# Patient Record
Sex: MALE | Race: WHITE | NOT HISPANIC OR LATINO | Employment: OTHER | URBAN - NONMETROPOLITAN AREA
[De-identification: names, ages, dates, MRNs, and addresses within clinical notes are randomized per-mention and may not be internally consistent; named-entity substitution may affect disease eponyms.]

---

## 2017-03-07 ENCOUNTER — OFFICE VISIT (OUTPATIENT)
Dept: CARDIOLOGY | Facility: CLINIC | Age: 47
End: 2017-03-07

## 2017-03-07 VITALS
HEART RATE: 86 BPM | RESPIRATION RATE: 16 BRPM | OXYGEN SATURATION: 98 % | HEIGHT: 69 IN | SYSTOLIC BLOOD PRESSURE: 102 MMHG | DIASTOLIC BLOOD PRESSURE: 74 MMHG | WEIGHT: 194 LBS | BODY MASS INDEX: 28.73 KG/M2

## 2017-03-07 DIAGNOSIS — Z71.6 TOBACCO ABUSE COUNSELING: ICD-10-CM

## 2017-03-07 DIAGNOSIS — I10 ESSENTIAL HYPERTENSION: ICD-10-CM

## 2017-03-07 DIAGNOSIS — R55 SYNCOPE AND COLLAPSE: ICD-10-CM

## 2017-03-07 DIAGNOSIS — I20.9 ISCHEMIC CHEST PAIN (HCC): Primary | ICD-10-CM

## 2017-03-07 DIAGNOSIS — E78.5 HYPERLIPIDEMIA, UNSPECIFIED HYPERLIPIDEMIA TYPE: ICD-10-CM

## 2017-03-07 PROCEDURE — 99203 OFFICE O/P NEW LOW 30 MIN: CPT | Performed by: INTERNAL MEDICINE

## 2017-03-07 RX ORDER — LANSOPRAZOLE 15 MG/1
15 CAPSULE, DELAYED RELEASE ORAL DAILY
COMMUNITY
Start: 2014-05-21 | End: 2022-09-20

## 2017-03-07 RX ORDER — ATORVASTATIN CALCIUM 40 MG/1
40 TABLET, FILM COATED ORAL NIGHTLY
Status: ON HOLD | COMMUNITY
Start: 2016-08-17 | End: 2022-09-27 | Stop reason: SDUPTHER

## 2017-03-07 RX ORDER — LISINOPRIL 5 MG/1
5 TABLET ORAL EVERY MORNING
COMMUNITY
Start: 2016-02-08 | End: 2022-09-20

## 2017-03-07 RX ORDER — SERTRALINE HYDROCHLORIDE 25 MG/1
50 TABLET, FILM COATED ORAL DAILY
COMMUNITY
Start: 2016-09-23

## 2017-03-07 RX ORDER — ASPIRIN 81 MG/1
81 TABLET ORAL DAILY
COMMUNITY
End: 2022-09-20

## 2017-03-07 NOTE — PROGRESS NOTES
Chief complaint : Chest pain, shortness of breath, syncope    History of Present Illness this is a very pleasant 46-year-old gentleman who presents today for cardiac evaluation.  Patient was found unconscious on the floor at his workplace.  He was taken to the local emergency room where he was evaluated and treated for possible carbon monoxide poisoning.  He continues to complain of chest discomfort and chest pain which is retrosternal in location exacerbated with physical exertion and relieved at rest.  Patient also complains of dyspnea on minimal exertion.  Patient does have very strong family history of heart disease.      His risk factors for coronary artery disease includes:  · Chronic tobacco abuse  · Family history of coronary artery disease  · Hyperlipidemia  · Diabetes mellitus         Review of Systems   Constitution: Negative.   HENT: Negative.    Eyes: Positive for blurred vision.   Cardiovascular: Positive for chest pain and dyspnea on exertion.   Respiratory: Positive for cough, shortness of breath and sputum production.    Skin: Negative.    Musculoskeletal: Positive for joint pain.   Gastrointestinal: Negative.    Genitourinary: Negative.    Neurological: Positive for numbness.   Psychiatric/Behavioral: Negative.        No past medical history on file.    Family History   Problem Relation Age of Onset   • Hypertension Mother    • Osteoporosis Mother    • Stroke Father    • Heart attack Father    • Diabetes Father    • Heart disease Sister        Review of patient's allergies indicates no known allergies.     reports that he has been smoking Cigarettes.  He has a 51.00 pack-year smoking history. He does not have any smokeless tobacco history on file. He reports that he drinks about 0.6 oz of alcohol per week  He reports that he uses illicit drugs, including Marijuana.    Objective     Vital Signs  Heart Rate:  [86] 86  Resp:  [16] 16  BP: (102)/(74) 102/74    Physical Exam   Constitutional: He is  oriented to person, place, and time. He appears well-developed and well-nourished.   HENT:   Head: Normocephalic and atraumatic.   Eyes: Conjunctivae and EOM are normal. Pupils are equal, round, and reactive to light.   Neck: Neck supple. No JVD present. Carotid bruit is not present. No tracheal deviation and no edema present.   Cardiovascular: Normal rate, regular rhythm, S1 normal, S2 normal, normal heart sounds and intact distal pulses.  Exam reveals no gallop, no S3, no S4 and no friction rub.    No murmur heard.  Pulmonary/Chest: Effort normal and breath sounds normal. He has no wheezes. He has no rales. He exhibits no tenderness.   Abdominal: Bowel sounds are normal. He exhibits no abdominal bruit and no pulsatile midline mass. There is no rebound and no guarding.   Musculoskeletal: Normal range of motion. He exhibits no edema.   Neurological: He is alert and oriented to person, place, and time.   Skin: Skin is warm and dry.   Psychiatric: He has a normal mood and affect.       Procedures    Assessment/Plan     Patient Active Problem List   Diagnosis   • Hyperlipidemia   • Essential hypertension   • Ischemic chest pain   • Tobacco abuse counseling     1. Ischemic chest pain  Patient's chest discomfort appears to be more consistent with angina pectoris.  Plan:  · I have counseled patient regarding tobacco smoking cessation.  I have given patient instructions and guidelines on how to quit smoking cigarettes.  We have also discussed regarding nicotine replacement therapy such as nicotine patches and nicotine lozenges and nicotine gums.  Patient appears to be very appreciative and understanding and is considering to quit smoking.  · I have advised patient to have a nuclear perfusion imaging stress test to evaluate for obstructive coronary disease and ischemia  · We will also obtain a 2-D echocardiogram study to evaluate patient's LV systolic and diastolic function.    2. Essential hypertension  Patient's blood  pressure appears to be well-controlled.  Continue with current dose LISINOPRIL.    3. Hyperlipidemia, unspecified hyperlipidemia type  I have advised patient to continue with current dose of atorvastatin.  We'll obtain his lipid panel from his primary care provider's office.  His latest liver function test appears to be within normal limits.    4.  Syncope and collapse  Patient's syncopal episode that was witnessed may have been secondary to carbon monoxide poisoning.  However I recommend that we perform a Holter monitor recording to evaluate for any potential cardiac arrhythmias.    I discussed the patients findings and my recommendations with patient.    Dinesh Martinez MD  03/07/17  4:21 PM    EMR Dragon/Transcription disclaimer:   Much of this encounter note is an electronic transcription/translation of spoken language to printed text. The electronic translation of spoken language may permit erroneous, or at times, nonsensical words or phrases to be inadvertently transcribed; Although I have reviewed the note for such errors, some may still exist.

## 2017-03-27 LAB
BH CV ECHO MEAS - % IVS THICK: 10 %
BH CV ECHO MEAS - % LVPW THICK: 54.9 %
BH CV ECHO MEAS - ACS: 1.8 CM
BH CV ECHO MEAS - AO MAX PG (FULL): 1.6 MMHG
BH CV ECHO MEAS - AO MAX PG: 4.8 MMHG
BH CV ECHO MEAS - AO MEAN PG (FULL): 1 MMHG
BH CV ECHO MEAS - AO MEAN PG: 3 MMHG
BH CV ECHO MEAS - AO ROOT AREA (BSA CORRECTED): 1.4
BH CV ECHO MEAS - AO ROOT AREA: 6.6 CM^2
BH CV ECHO MEAS - AO ROOT DIAM: 2.9 CM
BH CV ECHO MEAS - AO V2 MAX: 110 CM/SEC
BH CV ECHO MEAS - AO V2 MEAN: 80.1 CM/SEC
BH CV ECHO MEAS - AO V2 VTI: 22.2 CM
BH CV ECHO MEAS - AVA(I,A): 3 CM^2
BH CV ECHO MEAS - AVA(I,D): 3 CM^2
BH CV ECHO MEAS - AVA(V,A): 3.1 CM^2
BH CV ECHO MEAS - AVA(V,D): 3.1 CM^2
BH CV ECHO MEAS - BSA(HAYCOCK): 2.1 M^2
BH CV ECHO MEAS - BSA: 2 M^2
BH CV ECHO MEAS - BZI_BMI: 28.6 KILOGRAMS/M^2
BH CV ECHO MEAS - BZI_METRIC_HEIGHT: 175.3 CM
BH CV ECHO MEAS - BZI_METRIC_WEIGHT: 88 KG
BH CV ECHO MEAS - CONTRAST EF 4CH: 54.9 ML/M^2
BH CV ECHO MEAS - EDV(CUBED): 60.2 ML
BH CV ECHO MEAS - EDV(MOD-SP4): 111 ML
BH CV ECHO MEAS - EDV(TEICH): 66.7 ML
BH CV ECHO MEAS - EF(CUBED): 56.9 %
BH CV ECHO MEAS - EF(MOD-SP4): 54.9 %
BH CV ECHO MEAS - EF(TEICH): 49.2 %
BH CV ECHO MEAS - ESV(CUBED): 25.9 ML
BH CV ECHO MEAS - ESV(MOD-SP4): 50.1 ML
BH CV ECHO MEAS - ESV(TEICH): 33.9 ML
BH CV ECHO MEAS - FS: 24.5 %
BH CV ECHO MEAS - IVS/LVPW: 1
BH CV ECHO MEAS - IVSD: 0.77 CM
BH CV ECHO MEAS - IVSS: 0.85 CM
BH CV ECHO MEAS - LA DIMENSION: 3.7 CM
BH CV ECHO MEAS - LA/AO: 1.3
BH CV ECHO MEAS - LV DIASTOLIC VOL/BSA (35-75): 54.4 ML/M^2
BH CV ECHO MEAS - LV MASS(C)D: 85.6 GRAMS
BH CV ECHO MEAS - LV MASS(C)DI: 42 GRAMS/M^2
BH CV ECHO MEAS - LV MASS(C)S: 82.7 GRAMS
BH CV ECHO MEAS - LV MASS(C)SI: 40.6 GRAMS/M^2
BH CV ECHO MEAS - LV MAX PG: 3.3 MMHG
BH CV ECHO MEAS - LV MEAN PG: 2 MMHG
BH CV ECHO MEAS - LV SYSTOLIC VOL/BSA (12-30): 24.6 ML/M^2
BH CV ECHO MEAS - LV V1 MAX: 90.2 CM/SEC
BH CV ECHO MEAS - LV V1 MEAN: 63.7 CM/SEC
BH CV ECHO MEAS - LV V1 VTI: 17.5 CM
BH CV ECHO MEAS - LVIDD: 3.9 CM
BH CV ECHO MEAS - LVIDS: 3 CM
BH CV ECHO MEAS - LVLD AP4: 7.7 CM
BH CV ECHO MEAS - LVLS AP4: 6.6 CM
BH CV ECHO MEAS - LVOT AREA (M): 3.8 CM^2
BH CV ECHO MEAS - LVOT AREA: 3.8 CM^2
BH CV ECHO MEAS - LVOT DIAM: 2.2 CM
BH CV ECHO MEAS - LVPWD: 0.77 CM
BH CV ECHO MEAS - LVPWS: 1.2 CM
BH CV ECHO MEAS - MV A MAX VEL: 71.8 CM/SEC
BH CV ECHO MEAS - MV DEC SLOPE: 436 CM/SEC^2
BH CV ECHO MEAS - MV E MAX VEL: 85.9 CM/SEC
BH CV ECHO MEAS - MV E/A: 1.2
BH CV ECHO MEAS - PA MAX PG: 1.1 MMHG
BH CV ECHO MEAS - PA MEAN PG: 1 MMHG
BH CV ECHO MEAS - PA V2 MAX: 53.3 CM/SEC
BH CV ECHO MEAS - PA V2 MEAN: 38.8 CM/SEC
BH CV ECHO MEAS - PA V2 VTI: 12 CM
BH CV ECHO MEAS - PULM DIAS VEL: 34.4 CM/SEC
BH CV ECHO MEAS - PULM S/D: 1.1
BH CV ECHO MEAS - PULM SYS VEL: 38.3 CM/SEC
BH CV ECHO MEAS - RAP SYSTOLE: 10 MMHG
BH CV ECHO MEAS - RVSP: 28.9 MMHG
BH CV ECHO MEAS - SI(AO): 71.9 ML/M^2
BH CV ECHO MEAS - SI(CUBED): 16.8 ML/M^2
BH CV ECHO MEAS - SI(LVOT): 32.6 ML/M^2
BH CV ECHO MEAS - SI(MOD-SP4): 29.9 ML/M^2
BH CV ECHO MEAS - SI(TEICH): 16.1 ML/M^2
BH CV ECHO MEAS - SV(AO): 146.6 ML
BH CV ECHO MEAS - SV(CUBED): 34.3 ML
BH CV ECHO MEAS - SV(LVOT): 66.5 ML
BH CV ECHO MEAS - SV(MOD-SP4): 60.9 ML
BH CV ECHO MEAS - SV(TEICH): 32.8 ML
BH CV ECHO MEAS - TR MAX VEL: 217 CM/SEC

## 2017-04-18 ENCOUNTER — TRANSCRIBE ORDERS (OUTPATIENT)
Dept: GENERAL RADIOLOGY | Facility: CLINIC | Age: 47
End: 2017-04-18

## 2017-04-18 DIAGNOSIS — R31.0 GROSS HEMATURIA: Primary | ICD-10-CM

## 2017-04-25 ENCOUNTER — OFFICE VISIT (OUTPATIENT)
Dept: CARDIOLOGY | Facility: CLINIC | Age: 47
End: 2017-04-25

## 2017-04-25 VITALS
SYSTOLIC BLOOD PRESSURE: 110 MMHG | HEIGHT: 69 IN | BODY MASS INDEX: 27.68 KG/M2 | WEIGHT: 186.9 LBS | HEART RATE: 84 BPM | OXYGEN SATURATION: 98 % | DIASTOLIC BLOOD PRESSURE: 72 MMHG

## 2017-04-25 DIAGNOSIS — I10 ESSENTIAL HYPERTENSION: ICD-10-CM

## 2017-04-25 DIAGNOSIS — R94.39 ABNORMAL NUCLEAR STRESS TEST: Primary | ICD-10-CM

## 2017-04-25 DIAGNOSIS — R55 SYNCOPE AND COLLAPSE: ICD-10-CM

## 2017-04-25 DIAGNOSIS — E78.5 HYPERLIPIDEMIA, UNSPECIFIED HYPERLIPIDEMIA TYPE: Primary | ICD-10-CM

## 2017-04-25 PROCEDURE — 99214 OFFICE O/P EST MOD 30 MIN: CPT | Performed by: INTERNAL MEDICINE

## 2017-04-25 RX ORDER — INSULIN GLARGINE 100 [IU]/ML
INJECTION, SOLUTION SUBCUTANEOUS
Refills: 0 | COMMUNITY
Start: 2017-03-27 | End: 2017-06-15 | Stop reason: ALTCHOICE

## 2017-04-25 RX ORDER — SODIUM CHLORIDE 9 MG/ML
75 INJECTION, SOLUTION INTRAVENOUS CONTINUOUS
Status: CANCELLED | OUTPATIENT
Start: 2017-05-17

## 2017-04-25 RX ORDER — PROMETHAZINE HYDROCHLORIDE 25 MG/ML
12.5 INJECTION, SOLUTION INTRAMUSCULAR; INTRAVENOUS EVERY 4 HOURS PRN
Status: CANCELLED | OUTPATIENT
Start: 2017-05-17

## 2017-04-25 NOTE — PROGRESS NOTES
Chief complaint : Abnormal stress test    History of Present Illness this is a very pleasant 56-year-old gentleman who comes today to discuss findings of his studies including nuclear perfusion imaging stress test Holter monitor and 2-D echocardiogram.  He has not had anymore syncopal symptoms..  He continues to have chest discomfort.  He continues to have dyspnea on exertion.         Review of Systems   Cardiovascular: Positive for chest pain and dyspnea on exertion.       No past medical history on file.    Family History   Problem Relation Age of Onset   • Hypertension Mother    • Osteoporosis Mother    • Stroke Father    • Heart attack Father    • Diabetes Father    • Heart disease Sister        Review of patient's allergies indicates no known allergies.     reports that he has been smoking Cigarettes.  He has a 51.00 pack-year smoking history. He does not have any smokeless tobacco history on file. He reports that he drinks about 0.6 oz of alcohol per week  He reports that he uses illicit drugs, including Marijuana.    Objective     Vital Signs  Heart Rate:  [84] 84  BP: (110)/(72) 110/72    Physical Exam   Constitutional: He is oriented to person, place, and time. He appears well-developed and well-nourished.   HENT:   Head: Normocephalic and atraumatic.   Eyes: Conjunctivae and EOM are normal. Pupils are equal, round, and reactive to light.   Neck: Neck supple. No JVD present. Carotid bruit is not present. No tracheal deviation and no edema present.   Cardiovascular: Normal rate, regular rhythm, S1 normal, S2 normal, normal heart sounds and intact distal pulses.  Exam reveals no gallop, no S3, no S4 and no friction rub.    No murmur heard.  Pulmonary/Chest: Effort normal and breath sounds normal. He has no wheezes. He has no rales. He exhibits no tenderness.   Abdominal: Bowel sounds are normal. He exhibits no abdominal bruit and no pulsatile midline mass. There is no rebound and no guarding.    Musculoskeletal: Normal range of motion. He exhibits no edema.   Neurological: He is alert and oriented to person, place, and time.   Skin: Skin is warm and dry.   Psychiatric: He has a normal mood and affect.       Procedures    Assessment/Plan     Patient Active Problem List   Diagnosis   • Hyperlipidemia   • Essential hypertension   • Ischemic chest pain   • Tobacco abuse counseling     1. Hyperlipidemia, unspecified hyperlipidemia type  Patient currently is tolerating Lipitor very well.  He will need a fasting lipid panel and liver function test.  We'll continue with risk factor modification and guideline direct medical therapy.    2. Essential hypertension  His well controlled we'll continue with current medication.    3. Syncope and collapse  Patient has not had anymore syncopal episodes.    · Patient's nuclear perfusion imaging stress test was abnormal with evidence of ischemia of moderate size moderate severity in the right coronary artery distribution  · Patient's Holter monitor was essentially unremarkable with no evidence of malignant arrhythmias.  · 2-D echocardiogram revealed normal LV systolic function  Plan:  · I have discussed his results with him in detail.  I have recommended diagnostic coronary angiogram and possible PCI as indicated.  · I have discussed the indication alternatives and all potential complications of diagnostic coronary angiogram with the patient and he was given me permission to schedule him for the procedure.  All his questions were answered to his satisfaction.  · I have also showed him and educational  video of cardiac catheterization and possible stent placement.    I discussed the patients findings and my recommendations with patient.    Dinesh Martinez MD  04/25/17  6:22 PM    EMR Dragon/Transcription disclaimer:   Much of this encounter note is an electronic transcription/translation of spoken language to printed text. The electronic translation of spoken language may permit  erroneous, or at times, nonsensical words or phrases to be inadvertently transcribed; Although I have reviewed the note for such errors, some may still exist.

## 2017-05-17 ENCOUNTER — HOSPITAL ENCOUNTER (OUTPATIENT)
Facility: HOSPITAL | Age: 47
Setting detail: HOSPITAL OUTPATIENT SURGERY
Discharge: HOME | End: 2017-05-17
Attending: INTERNAL MEDICINE | Admitting: INTERNAL MEDICINE

## 2017-05-17 VITALS
HEIGHT: 69 IN | BODY MASS INDEX: 26.64 KG/M2 | RESPIRATION RATE: 18 BRPM | OXYGEN SATURATION: 99 % | SYSTOLIC BLOOD PRESSURE: 115 MMHG | WEIGHT: 179.9 LBS | DIASTOLIC BLOOD PRESSURE: 69 MMHG | TEMPERATURE: 97.9 F | HEART RATE: 59 BPM

## 2017-05-17 DIAGNOSIS — R94.39 ABNORMAL NUCLEAR STRESS TEST: ICD-10-CM

## 2017-05-17 LAB
ALBUMIN SERPL-MCNC: 4 G/DL (ref 3.4–4.8)
ALBUMIN/GLOB SERPL: 1.4 G/DL (ref 1.1–1.8)
ALP SERPL-CCNC: 99 U/L (ref 38–126)
ALT SERPL W P-5'-P-CCNC: 31 U/L (ref 21–72)
ANION GAP SERPL CALCULATED.3IONS-SCNC: 9 MMOL/L (ref 5–15)
ARTICHOKE IGE QN: 77 MG/DL (ref 1–129)
AST SERPL-CCNC: 24 U/L (ref 17–59)
BILIRUB SERPL-MCNC: 0.4 MG/DL (ref 0.2–1.3)
BUN BLD-MCNC: 15 MG/DL (ref 7–21)
BUN/CREAT SERPL: 19.7 (ref 7–25)
CALCIUM SPEC-SCNC: 9.2 MG/DL (ref 8.4–10.2)
CHLORIDE SERPL-SCNC: 101 MMOL/L (ref 95–110)
CHOLEST SERPL-MCNC: 133 MG/DL (ref 0–199)
CO2 SERPL-SCNC: 27 MMOL/L (ref 22–31)
CREAT BLD-MCNC: 0.76 MG/DL (ref 0.7–1.3)
DEPRECATED RDW RBC AUTO: 37.6 FL (ref 35.1–43.9)
ERYTHROCYTE [DISTWIDTH] IN BLOOD BY AUTOMATED COUNT: 12.5 % (ref 11.5–14.5)
GFR SERPL CREATININE-BSD FRML MDRD: 109 ML/MIN/1.73 (ref 60–147)
GLOBULIN UR ELPH-MCNC: 2.8 GM/DL (ref 2.3–3.5)
GLUCOSE BLD-MCNC: 194 MG/DL (ref 60–100)
HCT VFR BLD AUTO: 39.9 % (ref 39–49)
HDLC SERPL-MCNC: 38 MG/DL (ref 60–200)
HGB BLD-MCNC: 14.2 G/DL (ref 13.7–17.3)
INR PPP: 0.99 (ref 0.8–1.2)
LDLC/HDLC SERPL: 2.09 {RATIO} (ref 0–3.55)
MCH RBC QN AUTO: 29.6 PG (ref 26.5–34)
MCHC RBC AUTO-ENTMCNC: 35.6 G/DL (ref 31.5–36.3)
MCV RBC AUTO: 83.3 FL (ref 80–98)
PLATELET # BLD AUTO: 282 10*3/MM3 (ref 150–450)
PMV BLD AUTO: 10.1 FL (ref 8–12)
POTASSIUM BLD-SCNC: 4.3 MMOL/L (ref 3.5–5.1)
PROT SERPL-MCNC: 6.8 G/DL (ref 6.3–8.6)
PROTHROMBIN TIME: 13 SECONDS (ref 11.1–15.3)
RBC # BLD AUTO: 4.79 10*6/MM3 (ref 4.37–5.74)
SODIUM BLD-SCNC: 137 MMOL/L (ref 137–145)
TRIGL SERPL-MCNC: 77 MG/DL (ref 20–199)
WBC NRBC COR # BLD: 6.29 10*3/MM3 (ref 3.2–9.8)

## 2017-05-17 PROCEDURE — C1760 CLOSURE DEV, VASC: HCPCS | Performed by: INTERNAL MEDICINE

## 2017-05-17 PROCEDURE — 80061 LIPID PANEL: CPT | Performed by: INTERNAL MEDICINE

## 2017-05-17 PROCEDURE — 25010000002 FENTANYL CITRATE (PF) 100 MCG/2ML SOLUTION: Performed by: INTERNAL MEDICINE

## 2017-05-17 PROCEDURE — 80053 COMPREHEN METABOLIC PANEL: CPT | Performed by: INTERNAL MEDICINE

## 2017-05-17 PROCEDURE — 93458 L HRT ARTERY/VENTRICLE ANGIO: CPT | Performed by: INTERNAL MEDICINE

## 2017-05-17 PROCEDURE — C1894 INTRO/SHEATH, NON-LASER: HCPCS | Performed by: INTERNAL MEDICINE

## 2017-05-17 PROCEDURE — C1769 GUIDE WIRE: HCPCS | Performed by: INTERNAL MEDICINE

## 2017-05-17 PROCEDURE — 85610 PROTHROMBIN TIME: CPT | Performed by: INTERNAL MEDICINE

## 2017-05-17 PROCEDURE — 85027 COMPLETE CBC AUTOMATED: CPT | Performed by: INTERNAL MEDICINE

## 2017-05-17 PROCEDURE — 25010000002 MIDAZOLAM PER 1 MG: Performed by: INTERNAL MEDICINE

## 2017-05-17 PROCEDURE — 0 IOPAMIDOL PER 1 ML: Performed by: INTERNAL MEDICINE

## 2017-05-17 RX ORDER — LIDOCAINE HYDROCHLORIDE 20 MG/ML
INJECTION, SOLUTION INFILTRATION; PERINEURAL AS NEEDED
Status: DISCONTINUED | OUTPATIENT
Start: 2017-05-17 | End: 2017-05-17 | Stop reason: HOSPADM

## 2017-05-17 RX ORDER — FENTANYL CITRATE 50 UG/ML
INJECTION, SOLUTION INTRAMUSCULAR; INTRAVENOUS AS NEEDED
Status: DISCONTINUED | OUTPATIENT
Start: 2017-05-17 | End: 2017-05-17 | Stop reason: HOSPADM

## 2017-05-17 RX ORDER — PROMETHAZINE HYDROCHLORIDE 25 MG/ML
12.5 INJECTION, SOLUTION INTRAMUSCULAR; INTRAVENOUS EVERY 4 HOURS PRN
Status: DISCONTINUED | OUTPATIENT
Start: 2017-05-17 | End: 2017-05-17 | Stop reason: HOSPADM

## 2017-05-17 RX ORDER — SODIUM CHLORIDE 9 MG/ML
75 INJECTION, SOLUTION INTRAVENOUS CONTINUOUS
Status: DISCONTINUED | OUTPATIENT
Start: 2017-05-17 | End: 2017-05-17 | Stop reason: HOSPADM

## 2017-05-17 RX ORDER — MIDAZOLAM HYDROCHLORIDE 1 MG/ML
INJECTION INTRAMUSCULAR; INTRAVENOUS AS NEEDED
Status: DISCONTINUED | OUTPATIENT
Start: 2017-05-17 | End: 2017-05-17 | Stop reason: HOSPADM

## 2017-05-17 RX ORDER — SODIUM CHLORIDE 9 MG/ML
125 INJECTION, SOLUTION INTRAVENOUS CONTINUOUS
Status: DISCONTINUED | OUTPATIENT
Start: 2017-05-17 | End: 2017-05-17 | Stop reason: HOSPADM

## 2017-05-17 RX ADMIN — SODIUM CHLORIDE 75 ML/HR: 9 INJECTION, SOLUTION INTRAVENOUS at 08:10

## 2017-06-15 ENCOUNTER — OFFICE VISIT (OUTPATIENT)
Dept: CARDIOLOGY | Facility: CLINIC | Age: 47
End: 2017-06-15

## 2017-06-15 VITALS
DIASTOLIC BLOOD PRESSURE: 80 MMHG | BODY MASS INDEX: 26.22 KG/M2 | OXYGEN SATURATION: 98 % | HEIGHT: 69 IN | HEART RATE: 87 BPM | SYSTOLIC BLOOD PRESSURE: 136 MMHG | WEIGHT: 177 LBS | RESPIRATION RATE: 16 BRPM

## 2017-06-15 DIAGNOSIS — E78.5 HYPERLIPIDEMIA, UNSPECIFIED HYPERLIPIDEMIA TYPE: ICD-10-CM

## 2017-06-15 DIAGNOSIS — I10 ESSENTIAL HYPERTENSION: Primary | ICD-10-CM

## 2017-06-15 PROCEDURE — 99212 OFFICE O/P EST SF 10 MIN: CPT | Performed by: INTERNAL MEDICINE

## 2017-06-15 RX ORDER — INSULIN DEGLUDEC INJECTION 100 U/ML
12 INJECTION, SOLUTION SUBCUTANEOUS NIGHTLY
Refills: 0 | COMMUNITY
Start: 2017-06-05 | End: 2022-09-20

## 2017-06-15 NOTE — PROGRESS NOTES
Chief complaint : Coronary artery disease    History of Present Illness very pleasant 47-year-old gentleman who comes today for a follow-up visit.  He underwent diagnostic coronary angiogram which revealed nonobstructive coronary artery disease in the right coronary artery and the diagonal branch of the LAD.  Patient has no chest pain or chest discomfort.  He denies shortness of breath orthopnea or PND.  Patient had no complications from the diagnostic coronary angiogram.    Subjective      Review of Systems   Cardiovascular: Negative for chest pain and dyspnea on exertion.       History reviewed. No pertinent past medical history.    Family History   Problem Relation Age of Onset   • Hypertension Mother    • Osteoporosis Mother    • Stroke Father    • Heart attack Father    • Diabetes Father    • Heart disease Sister        Review of patient's allergies indicates no known allergies.     reports that he has been smoking Cigarettes.  He has a 51.00 pack-year smoking history. He does not have any smokeless tobacco history on file. He reports that he drinks about 0.6 oz of alcohol per week  He reports that he uses illicit drugs, including Marijuana.    Objective     Vital Signs  Heart Rate:  [87] 87  Resp:  [16] 16  BP: (136)/(80) 136/80    Physical Exam   Constitutional: He is oriented to person, place, and time. He appears well-developed and well-nourished.   HENT:   Head: Normocephalic and atraumatic.   Eyes: Conjunctivae and EOM are normal. Pupils are equal, round, and reactive to light.   Neck: Neck supple. No JVD present. Carotid bruit is not present. No tracheal deviation and no edema present.   Cardiovascular: Normal rate, regular rhythm, S1 normal, S2 normal, normal heart sounds and intact distal pulses.  Exam reveals no gallop, no S3, no S4 and no friction rub.    No murmur heard.  Pulmonary/Chest: Effort normal and breath sounds normal. He has no wheezes. He has no rales. He exhibits no tenderness.    Abdominal: Bowel sounds are normal. He exhibits no abdominal bruit and no pulsatile midline mass. There is no rebound and no guarding.   Musculoskeletal: Normal range of motion. He exhibits no edema.   Neurological: He is alert and oriented to person, place, and time.   Skin: Skin is warm and dry.   Psychiatric: He has a normal mood and affect.       Procedures    Assessment/Plan     Patient Active Problem List   Diagnosis   • Hyperlipidemia   • Essential hypertension   • Ischemic chest pain   • Tobacco abuse counseling     1. Essential hypertension  Blood pressure is well-controlled at this time.  Continue with current medications    2. Hyperlipidemia, unspecified hyperlipidemia type  Patient is tolerating Lipitor very well.  Continue with current medication    I discussed the patients findings and my recommendations with patient.    Dinesh Martinez MD  06/15/17  3:59 PM

## 2019-10-29 ENCOUNTER — OFFICE VISIT (OUTPATIENT)
Dept: ENDOCRINOLOGY | Facility: CLINIC | Age: 49
End: 2019-10-29

## 2019-10-29 VITALS
HEIGHT: 69 IN | BODY MASS INDEX: 28.17 KG/M2 | OXYGEN SATURATION: 100 % | HEART RATE: 80 BPM | SYSTOLIC BLOOD PRESSURE: 140 MMHG | WEIGHT: 190.2 LBS | DIASTOLIC BLOOD PRESSURE: 78 MMHG

## 2019-10-29 DIAGNOSIS — Z79.4 TYPE 2 DIABETES MELLITUS WITH HYPERGLYCEMIA, WITH LONG-TERM CURRENT USE OF INSULIN (HCC): Primary | ICD-10-CM

## 2019-10-29 DIAGNOSIS — E11.65 TYPE 2 DIABETES MELLITUS WITH HYPERGLYCEMIA, WITH LONG-TERM CURRENT USE OF INSULIN (HCC): Primary | ICD-10-CM

## 2019-10-29 DIAGNOSIS — F17.200 SMOKING: ICD-10-CM

## 2019-10-29 DIAGNOSIS — E78.5 HYPERLIPIDEMIA, UNSPECIFIED HYPERLIPIDEMIA TYPE: ICD-10-CM

## 2019-10-29 DIAGNOSIS — I10 ESSENTIAL HYPERTENSION: ICD-10-CM

## 2019-10-29 PROCEDURE — 99204 OFFICE O/P NEW MOD 45 MIN: CPT | Performed by: NURSE PRACTITIONER

## 2019-10-29 PROCEDURE — 95250 CONT GLUC MNTR PHYS/QHP EQP: CPT | Performed by: NURSE PRACTITIONER

## 2019-10-29 RX ORDER — TIZANIDINE 4 MG/1
4 TABLET ORAL NIGHTLY PRN
COMMUNITY
End: 2022-09-20

## 2019-10-29 RX ORDER — TAMSULOSIN HYDROCHLORIDE 0.4 MG/1
1 CAPSULE ORAL DAILY
COMMUNITY

## 2019-10-29 RX ORDER — AMITRIPTYLINE HYDROCHLORIDE 50 MG/1
50 TABLET, FILM COATED ORAL NIGHTLY
COMMUNITY

## 2019-10-29 RX ORDER — DICLOFENAC SODIUM AND MISOPROSTOL 75; 200 MG/1; UG/1
1 TABLET, DELAYED RELEASE ORAL 2 TIMES DAILY
COMMUNITY
End: 2022-09-20

## 2019-10-29 NOTE — PATIENT INSTRUCTIONS
Keep metformin     Decrease Tresiba to 30 units once daily       Start Trulicity 0.75 mg once weekly     Side effects include nausea     If abdominal pain or vomiting stop medication and call me     Megan RAMOS       Eat a healthy meal and check 2 hours after meal --- if the sugar 180 or less then you don't need insulin for that meal    If 2 hours after meals the sugar is higher than 180 for that meal you will need 4 to 6 units

## 2019-10-29 NOTE — PROGRESS NOTES
Subjective    HPI       Referring provider Leon Salas     49 year old male presents for consultation     REASON - diabetes mellitus       Duration/Timing -diagnosed in 2016 / constant       Quality -not controlled       Severity -   Moderate due to complications       Severity (Complication/Hospitalizations)          Secondary Macrovascular -- no CAD, no PAD, no CVA         Secondary Microvascular -- neuropathy in hands, no diabetic retinopathy, no renal disease       Context -- presented for lead poisoning and blood sugar was 900             Diabetes Regimen -- oral medications, insulin       Complications -- hyperglycemia and hypoglycemia            Lab Results   Component Value Date    HGBA1C 10.6 (H) 09/07/2019           Blood Glucose Readings      Checks 3 to 4 times week           Last night was 48 and this morning was 58       higest 270 up to 340       Diet-       Regular diet     Does not drink regular coke or sweet tea        Associated Signs/Symptoms         Hyperglycemic Symptoms: polydipsia       Hypoglycemic Episodes: nocturnal --- sweating       Review of Systems  Review of Systems   Constitutional: Negative for activity change, appetite change, chills, fatigue, unexpected weight gain and unexpected weight loss.   HENT: Negative for congestion, dental problem, ear discharge, ear pain, swollen glands, tinnitus, trouble swallowing and voice change.    Eyes: Negative for blurred vision, photophobia, pain, discharge, redness, itching and visual disturbance.   Respiratory: Negative for apnea, cough, choking, chest tightness, shortness of breath and wheezing.    Cardiovascular: Negative for chest pain, palpitations and leg swelling.   Gastrointestinal: Negative for abdominal distention, abdominal pain, constipation, diarrhea, nausea and vomiting.   Endocrine: Negative for cold intolerance, heat intolerance, polydipsia and polyphagia.   Genitourinary: Negative for breast discharge, decreased libido,  decreased urine volume and difficulty urinating.   Musculoskeletal: Negative for arthralgias, back pain and neck pain.   Skin: Negative for color change, dry skin and skin lesions.   Allergic/Immunologic: Negative for environmental allergies, food allergies and immunocompromised state.   Neurological: Negative for dizziness, tremors, weakness, numbness, headache, memory problem and confusion.   Hematological: Negative for adenopathy.   Psychiatric/Behavioral: Negative for agitation, behavioral problems, decreased concentration and depressed mood. The patient is not nervous/anxious.      Wt Readings from Last 3 Encounters:   10/29/19 86.3 kg (190 lb 3.2 oz)   06/15/17 80.3 kg (177 lb)   05/17/17 81.6 kg (179 lb 14.3 oz)     Temp Readings from Last 3 Encounters:   05/17/17 97.9 °F (36.6 °C)   04/30/17 98.5 °F (36.9 °C) (Oral)     BP Readings from Last 3 Encounters:   10/29/19 140/78   06/15/17 136/80   05/17/17 115/69     Pulse Readings from Last 3 Encounters:   10/29/19 80   06/15/17 87   05/17/17 59       Physical Exam  Physical Exam   Constitutional: He is oriented to person, place, and time. He appears well-developed and well-nourished. No distress.   HENT:   Head: Normocephalic and atraumatic.   Right Ear: External ear normal.   Left Ear: External ear normal.   Nose: Nose normal.   Eyes: Conjunctivae and EOM are normal. Pupils are equal, round, and reactive to light.   Neck: Normal range of motion. Neck supple. No tracheal deviation present. No thyromegaly present.   Cardiovascular: Normal rate, regular rhythm and normal heart sounds.   No murmur heard.  Pulmonary/Chest: Effort normal and breath sounds normal. No respiratory distress. He has no wheezes.   Abdominal: Soft. Bowel sounds are normal. There is no tenderness. There is no rebound and no guarding.   Musculoskeletal: Normal range of motion. He exhibits no edema, tenderness or deformity.      During the foot exam he had a monofilament test performed.     Neurological Sensory Findings -  Left ankle/foot altered proprioception. No right ankle/foot altered proprioception  Vascular Status -  His right foot exhibits no edema. His left foot exhibits no edema.  Skin Integrity  -  He has callous right foot and right heel is dry and cracked.He has callous left foot and left heel dry and cracked..  Neurological: He is alert and oriented to person, place, and time. No cranial nerve deficit.   Skin: Skin is warm and dry. No rash noted.   Psychiatric: He has a normal mood and affect. His behavior is normal. Judgment and thought content normal.         Assessment/Plan       Diagnosis Plan   1. Type 2 diabetes mellitus with hyperglycemia, with long-term current use of insulin (CMS/[facility])     2. Hyperlipidemia, unspecified hyperlipidemia type     3. Essential hypertension     4. Smoking           Glycemic Management    Diabetes Mellitus       Lab Results   Component Value Date    HGBA1C 10.6 (H) 09/07/2019           Metformin 1000 po BID --keep       Tresiba taking 22 units BID --- decrease to 30 units once daily       Humulin R 100 --- 10 units TID with each meal --- decrease to 4 units up to 6 units with each meal      However I want you to eat a healthy meal and give no insulin -- check bg 2 hours later if sugar is less than 180 then for that meal you don't need to give insulin for that meal    Eat an unhealthy meal and then check 2 hours later --- if BG above 180 for that meal you will give 4 to 6 units         Start Trulicity 0.75 mg once weekly     Side effects include nausea     If nauseated eat less     If abdominal pain or vomiting stop medication and call me         Uncontrolled diabetes  Hyperglycemia    Insertion of continuous glucose monitor to define patter    The continuous glucose monitor that was inserted is a ana luisa glucose monitor    Return in 2 weeks           Lipid Management      Lipitor 40 mg one at night       Lab Results   Component Value Date    CHOL 172  09/07/2019     Lab Results   Component Value Date    TRIG 53 09/07/2019     Lab Results   Component Value Date    HDL 49 09/07/2019     Lab Results   Component Value Date     (H) 09/07/2019     No results found for: VLDL  Lab Results   Component Value Date    LDLHDL 2.09 05/17/2017       Blood Pressure Management        Lisinopril 5 mg daily           Microvascular Complication Monitoring        Last eye exam --  Overdue --states will make an appt      Foot exam done today       No results found for: CRP      Bone Health      Lab Results   Component Value Date    CALCIUM 9.5 07/10/2018             Other Diabetes Related Aspects      No results found for: [id number]      Thyroid Health      No results found for: TSH       Preventive Care:    Jasmine Hernández is a current cigarettes user.  He currently smokes 12 pack of cigarettes and cigarettes per day for a duration of 30 years. I have educated him on the risk of diseases from using tobacco products such as cancer, COPD and heart diease.     I advised him to quit and he is not willing to quit.    I spent 3  minutes counseling the patient.        Return in about 2 weeks (around 11/12/2019) for Recheck.        Records from  received and reviewed from 2019  Thank you for this consultation

## 2019-10-30 ENCOUNTER — TELEPHONE (OUTPATIENT)
Dept: FAMILY MEDICINE CLINIC | Facility: CLINIC | Age: 49
End: 2019-10-30

## 2019-10-30 NOTE — TELEPHONE ENCOUNTER
Wife called and left  and we called back . She said he took his 1 X a week med couldn't remember if in was trulicity or tresiba and reacted badly . Sugar dropped to 20's and he got it up to 80's went to work today and came home having chest pains, sweating and short of breathe. They are at the [facility] now .Wife gave the number [phone]    Thank You

## 2019-10-30 NOTE — TELEPHONE ENCOUNTER
Just wanted you forward this to you. I tried calling and no answer.....    (Wife called and left VM and we called back . She said he took his 1 X a week med couldn't remember if in was trulicity or tresiba and reacted badly . Sugar dropped to 20's and he got it up to 80's went to work today and came home having chest pains, sweating and short of breathe. They are at the [facility] ER now .Wife gave the number [phone])

## 2019-10-31 NOTE — TELEPHONE ENCOUNTER
Spoke with wife- she said he had severe back pain, chest pain, headaches and cramps. They went to the ER and they did test and ruled out stroke or heart attack. They thought it might be the trulicity but they said the ER said it wasn't. Any changes or need him to come in?

## 2019-11-18 ENCOUNTER — OFFICE VISIT (OUTPATIENT)
Dept: ENDOCRINOLOGY | Facility: CLINIC | Age: 49
End: 2019-11-18

## 2019-11-18 ENCOUNTER — TELEPHONE (OUTPATIENT)
Dept: ENDOCRINOLOGY | Facility: CLINIC | Age: 49
End: 2019-11-18

## 2019-11-18 VITALS
DIASTOLIC BLOOD PRESSURE: 74 MMHG | SYSTOLIC BLOOD PRESSURE: 130 MMHG | OXYGEN SATURATION: 99 % | HEART RATE: 80 BPM | BODY MASS INDEX: 28.07 KG/M2 | WEIGHT: 189.5 LBS | HEIGHT: 69 IN

## 2019-11-18 DIAGNOSIS — Z79.4 TYPE 2 DIABETES MELLITUS WITH HYPOGLYCEMIA WITHOUT COMA, WITH LONG-TERM CURRENT USE OF INSULIN (HCC): Primary | ICD-10-CM

## 2019-11-18 DIAGNOSIS — E11.649 TYPE 2 DIABETES MELLITUS WITH HYPOGLYCEMIA WITHOUT COMA, WITH LONG-TERM CURRENT USE OF INSULIN (HCC): ICD-10-CM

## 2019-11-18 DIAGNOSIS — Z79.4 TYPE 2 DIABETES MELLITUS WITH HYPOGLYCEMIA WITHOUT COMA, WITH LONG-TERM CURRENT USE OF INSULIN (HCC): ICD-10-CM

## 2019-11-18 DIAGNOSIS — Z71.6 TOBACCO ABUSE COUNSELING: Primary | ICD-10-CM

## 2019-11-18 DIAGNOSIS — E11.649 TYPE 2 DIABETES MELLITUS WITH HYPOGLYCEMIA WITHOUT COMA, WITH LONG-TERM CURRENT USE OF INSULIN (HCC): Primary | ICD-10-CM

## 2019-11-18 PROCEDURE — 95251 CONT GLUC MNTR ANALYSIS I&R: CPT | Performed by: NURSE PRACTITIONER

## 2019-11-18 PROCEDURE — 99214 OFFICE O/P EST MOD 30 MIN: CPT | Performed by: NURSE PRACTITIONER

## 2019-11-18 RX ORDER — FLASH GLUCOSE SENSOR
KIT MISCELLANEOUS
Qty: 3 EACH | Refills: 11 | Status: SHIPPED | OUTPATIENT
Start: 2019-11-18

## 2019-11-18 RX ORDER — FLASH GLUCOSE SENSOR
1 KIT MISCELLANEOUS AS NEEDED
Qty: 1 DEVICE | Refills: 1 | Status: SHIPPED | OUTPATIENT
Start: 2019-11-18

## 2019-11-18 RX ORDER — INSULIN LISPRO 100 [IU]/ML
INJECTION, SOLUTION INTRAVENOUS; SUBCUTANEOUS
Qty: 2 PEN | Refills: 11 | Status: SHIPPED | OUTPATIENT
Start: 2019-11-18 | End: 2022-09-20 | Stop reason: ALTCHOICE

## 2019-11-18 NOTE — PROGRESS NOTES
Subjective    Dionisio Hernández is a 49 y.o. male. he is here today for follow-up.    History of Present Illness         Referring provider Leon Salas      49 year old male presents for follow up      REASON - diabetes mellitus         Duration/Timing -diagnosed in 2016 / constant         Quality -not controlled         Severity -   Moderate due to complications         Severity (Complication/Hospitalizations)           Secondary Macrovascular -- no CAD, no PAD, no CVA         Secondary Microvascular -- neuropathy in hands, no diabetic retinopathy, no renal disease         Context -- presented for lead poisoning and blood sugar was 900                Diabetes Regimen -- oral medications, insulin         Complications -- hyperglycemia and hypoglycemia                   Lab Results   Component Value Date     HGBA1C 10.6 (H) 09/07/2019               Blood Glucose Readings        Checks 3 to 4 times week        Trish reviewed and downloaded     October 29 - Nov 3, 2019       Average bg 139     46% in target     32% above target     22% low sugar         Had severe low presented to ER --- low of 22               Diet-        Regular diet      Does not drink regular coke or sweet tea           Associated Signs/Symptoms          Hyperglycemic Symptoms: polydipsia       Hypoglycemic Episodes: nocturnal --- sweating    The following portions of the patient's history were reviewed and updated as appropriate:   Past Medical History:   Diagnosis Date   • Cancer (CMS/HCC)    • High blood pressure    • High cholesterol    • Kidney stones    • Osteoporosis    • Stroke (cerebrum) (CMS/HCC)    • Type 2 diabetes mellitus (CMS/HCC)      Past Surgical History:   Procedure Laterality Date   • CARDIAC CATHETERIZATION N/A 5/17/2017    Procedure: Left Heart Cath;  Surgeon: Dinesh Martinez MD;  Location: [facility] CATH INVASIVE LOCATION;  Service:      Family History   Problem Relation Age of Onset   • Hypertension Mother    • Osteoporosis  Mother    • Stroke Father    • Heart attack Father    • Diabetes Father    • Heart disease Sister        Current Outpatient Medications   Medication Sig Dispense Refill   • amitriptyline (ELAVIL) 50 MG tablet Take 50 mg by mouth Every Night.     • aspirin (ASPIRIN ADULT LOW DOSE) 81 MG EC tablet Take 81 mg by mouth Daily.     • atorvastatin (LIPITOR) 40 MG tablet Take 40 mg by mouth Every Night.     • diclofenac-miSOPROStol (ARTHROTEC 75) 75-0.2 MG EC tablet Take 1 tablet by mouth 2 (Two) Times a Day.     • lansoprazole (PREVACID) 15 MG capsule Take 15 mg by mouth Daily.     • lisinopril (PRINIVIL,ZESTRIL) 5 MG tablet Take 5 mg by mouth Every Morning.     • metFORMIN (GLUCOPHAGE) 1000 MG tablet Take 1,000 mg by mouth 2 (Two) Times a Day.     • sertraline (ZOLOFT) 25 MG tablet Take 50 mg by mouth Daily.     • tamsulosin (FLOMAX) 0.4 MG capsule 24 hr capsule Take 1 capsule by mouth Daily.     • tiZANidine (ZANAFLEX) 4 MG tablet Take 4 mg by mouth At Night As Needed for Muscle Spasms.     • TRESIBA FLEXTOUCH 100 UNIT/ML solution pen-injector injection Inject 12 Units under the skin Every Night.  0   • Continuous Blood Gluc  (FREESTYLE LYNDA READER) device 1 each As Needed (glucose monitoring). E10.65 1 Device 1   • Continuous Blood Gluc Sensor (FREESTYLE LYNDA SENSOR SYSTEM) Change every 14 days, E10.65 3 each 11   • Dulaglutide 0.75 MG/0.5ML solution pen-injector Inject 0.75 mg under the skin into the appropriate area as directed 1 (One) Time Per Week. 4 pen 11   • Insulin Lispro, 1 Unit Dial, (HUMALOG KWIKPEN) 100 UNIT/ML solution pen-injector Take 2 units up to 8 units based on carb count TID before meals 2 pen 11     No current facility-administered medications for this visit.      No Known Allergies  Social History     Socioeconomic History   • Marital status: Single     Spouse name: Not on file   • Number of children: Not on file   • Years of education: Not on file   • Highest education level: Not on file  "  Occupational History   • Occupation:    Tobacco Use   • Smoking status: Current Every Day Smoker     Packs/day: 1.50     Years: 34.00     Pack years: 51.00     Types: Cigarettes   • Tobacco comment: Cut down to 3 cigarettes daily   Substance and Sexual Activity   • Alcohol use: Yes     Alcohol/week: 0.6 oz     Types: 1 Cans of beer per week     Comment: fifth of whiskey one time of month   • Drug use: Yes     Types: Marijuana     Comment: daily   • Sexual activity: Defer       Review of Systems  Review of Systems   Constitutional: Negative for activity change, appetite change, diaphoresis and fatigue.   HENT: Negative for facial swelling, sneezing, sore throat, tinnitus, trouble swallowing and voice change.    Eyes: Negative for photophobia, pain, discharge, redness, itching and visual disturbance.   Respiratory: Negative for apnea, cough, choking, chest tightness and shortness of breath.    Cardiovascular: Negative for chest pain, palpitations and leg swelling.   Gastrointestinal: Negative for abdominal distention, abdominal pain, constipation, diarrhea, nausea and vomiting.   Endocrine: Negative for cold intolerance, heat intolerance, polydipsia, polyphagia and polyuria.   Genitourinary: Negative for difficulty urinating, dysuria, frequency, hematuria and urgency.   Musculoskeletal: Negative for arthralgias, back pain, gait problem, joint swelling, myalgias, neck pain and neck stiffness.   Skin: Negative for color change, pallor, rash and wound.   Neurological: Negative for dizziness, tremors, weakness, light-headedness, numbness and headaches.   Hematological: Negative for adenopathy. Does not bruise/bleed easily.   Psychiatric/Behavioral: Negative for behavioral problems, confusion and sleep disturbance.        Objective    /74   Pulse 80   Ht 175.3 cm (69\")   Wt 86 kg (189 lb 8 oz)   SpO2 99%   BMI 27.98 kg/m²   Physical Exam   Constitutional: He is oriented to person, place, and time. He " appears well-developed and well-nourished. No distress.   HENT:   Head: Normocephalic and atraumatic.   Right Ear: External ear normal.   Left Ear: External ear normal.   Nose: Nose normal.   Eyes: Conjunctivae and EOM are normal. Pupils are equal, round, and reactive to light.   Neck: Normal range of motion. Neck supple. No tracheal deviation present. No thyromegaly present.   Cardiovascular: Normal rate, regular rhythm and normal heart sounds.   No murmur heard.  Pulmonary/Chest: Effort normal and breath sounds normal. No respiratory distress. He has no wheezes.   Abdominal: Soft. Bowel sounds are normal. There is no tenderness. There is no rebound and no guarding.   Musculoskeletal: Normal range of motion. He exhibits no edema, tenderness or deformity.   Neurological: He is alert and oriented to person, place, and time. No cranial nerve deficit.   Skin: Skin is warm and dry. No rash noted.   Psychiatric: He has a normal mood and affect. His behavior is normal. Judgment and thought content normal.       Lab Review  Glucose (mg/dL)   Date Value   05/17/2017 194 (H)     Sodium (mmol/L)   Date Value   07/10/2018 137   04/09/2018 137   01/09/2018 135     Potassium (mmol/L)   Date Value   07/10/2018 4.3   04/09/2018 4.3   01/09/2018 4.4     Chloride (mmol/L)   Date Value   07/10/2018 102   04/09/2018 100   01/09/2018 98     CO2 (mmol/L)   Date Value   05/17/2017 27.0     BUN (mg/dL)   Date Value   07/10/2018 19 (H)   04/09/2018 17   01/09/2018 17     Creatinine (mg/dL)   Date Value   07/10/2018 1   04/09/2018 1.1   01/09/2018 1     Hemoglobin A1C (%)   Date Value   09/07/2019 10.6 (H)   05/23/2019 10.7 (H)   03/02/2019 9.7 (H)     Triglycerides (mg/dL)   Date Value   09/07/2019 53   05/23/2019 69   03/02/2019 30     LDL Cholesterol  (mg/dL)   Date Value   09/07/2019 110 (H)   05/23/2019 64   03/02/2019 73       Assessment/Plan      1. Tobacco abuse counseling    2. Type 2 diabetes mellitus with hypoglycemia without  coma, with long-term current use of insulin (CMS/[facility])    .    Medications prescribed:  Outpatient Encounter Medications as of 11/18/2019   Medication Sig Dispense Refill   • amitriptyline (ELAVIL) 50 MG tablet Take 50 mg by mouth Every Night.     • aspirin (ASPIRIN ADULT LOW DOSE) 81 MG EC tablet Take 81 mg by mouth Daily.     • atorvastatin (LIPITOR) 40 MG tablet Take 40 mg by mouth Every Night.     • diclofenac-miSOPROStol (ARTHROTEC 75) 75-0.2 MG EC tablet Take 1 tablet by mouth 2 (Two) Times a Day.     • lansoprazole (PREVACID) 15 MG capsule Take 15 mg by mouth Daily.     • lisinopril (PRINIVIL,ZESTRIL) 5 MG tablet Take 5 mg by mouth Every Morning.     • metFORMIN (GLUCOPHAGE) 1000 MG tablet Take 1,000 mg by mouth 2 (Two) Times a Day.     • sertraline (ZOLOFT) 25 MG tablet Take 50 mg by mouth Daily.     • tamsulosin (FLOMAX) 0.4 MG capsule 24 hr capsule Take 1 capsule by mouth Daily.     • tiZANidine (ZANAFLEX) 4 MG tablet Take 4 mg by mouth At Night As Needed for Muscle Spasms.     • TRESIBA FLEXTOUCH 100 UNIT/ML solution pen-injector injection Inject 12 Units under the skin Every Night.  0   • [DISCONTINUED] insulin regular (HUMULIN R) 500 UNIT/ML CONCENTRATED injection Inject 10 Units under the skin into the appropriate area as directed 3 (Three) Times a Day Before Meals.     • Continuous Blood Gluc  (FREESTYLE LYNDA READER) device 1 each As Needed (glucose monitoring). E10.65 1 Device 1   • Continuous Blood Gluc Sensor (FREESTYLE LYNDA SENSOR SYSTEM) Change every 14 days, E10.65 3 each 11   • Dulaglutide 0.75 MG/0.5ML solution pen-injector Inject 0.75 mg under the skin into the appropriate area as directed 1 (One) Time Per Week. 4 pen 11   • Insulin Lispro, 1 Unit Dial, (HUMALOG KWIKPEN) 100 UNIT/ML solution pen-injector Take 2 units up to 8 units based on carb count TID before meals 2 pen 11     No facility-administered encounter medications on file as of 11/18/2019.        Orders placed during this  encounter include:  No orders of the defined types were placed in this encounter.    Glycemic Management     Diabetes Mellitus               Lab Results   Component Value Date     HGBA1C 10.6 (H) 09/07/2019               Metformin 1000 po BID --keep         Tresiba taking 30 units once daily- -- decrease to 25 units         Humulin R 100 --- 10 units TID with each meal --- decrease to 4 units up to 6 units with each meal---change to carb counting         Change to Humalog     1 unit per 15 grams of CHO          Trulicity 0.75 mg once weekly ----stopped due to low sugar and made sick                 Trish reviewed and downloaded     Average bg 139     46% in target     32% above target     22% low sugar     Decrease Tresiba to 25 units     Mealtime do 1 unit per 15 gm of CHO    Stop Trulicity     Meet with magdiel for diabetes education               Lipid Management        Lipitor 40 mg one at night               Lab Results   Component Value Date     CHOL 172 09/07/2019            Lab Results   Component Value Date     TRIG 53 09/07/2019            Lab Results   Component Value Date     HDL 49 09/07/2019            Lab Results   Component Value Date      (H) 09/07/2019      No results found for: VLDL        Lab Results   Component Value Date     LDLHDL 2.09 05/17/2017         Blood Pressure Management           Lisinopril 5 mg daily               Microvascular Complication Monitoring           Last eye exam --  Overdue --states will make an appt        Foot exam done today         No results found for: CRP        Bone Health        Lab Results   Component Value Date     CALCIUM 9.5 07/10/2018                  Other Diabetes Related Aspects        No results found for: [id number]        Thyroid Health        No results found for: TSH         Preventive Care:     Smoking    Dionisio Hernández is a current cigarettes user.  He currently smokes 10 pack of cigarettes and cigarettes per day for a duration of 20  years. I have educated him on the risk of diseases from using tobacco products such as cancer, COPD and heart diease.     I advised him to quit and he is not willing to quit.    I spent 3  minutes counseling the patient.                    4. Follow-up: Return in about 8 weeks (around 1/13/2020) for Recheck.

## 2019-11-18 NOTE — PROGRESS NOTES
Dionisio Hernández is a 49 y.o.. male seen by diabetes educator on 11/18/2019 for review of medications and carbohydrate counting education.     1. Carbohydrate Counting/ Exchange Choices and Healthy Foods   I. Reviewed carbohydrate-containing foods, standard serving sizes, how to measure foods, and reading nutrition labels.   II. Provided patient with carbohydrate counting booklet (Carb counting and meal planning book).   III. Provided patient with list of non-starchy vegetables and foods that are low in carbohydrate for snacks and to incorporate with meals.     IV. Instructed patient to eat 60-75 grams of carbohydrate with each meal (4-5 exchange choices) and 15-30 grams of carbohydrate for snacks (1-2 exchange choices).    V. Reviewed the difference between simple and complex carbohydrate. Encouraged patient to choose complex carbohydrates more often.     VI. Choose fruits, vegetables, whole grains, legumes, low-fat milk, fiber-rich foods, minimal saturated fats, and watch cholesterol and sodium intake.    VII. Patient demonstrated understanding by correctly identifying and calculating carbohydrate amounts for various meals.    2. Humalog Dosing   I. 1 units of Humalog for every 15 grams of carbohydrate eaten plus 1 units per 50 mg/dl above 150 sliding scale   II. Patient demonstrated understanding by calculating correct dosage for example meals and blood sugars    3. Tresiba Dosing   I. Decrease dose to 25 units. Same time every day. Patient stated he normally gives between 5-7pm.     II. Reviewed titration: if fasting blood glucose is above 150, increase dose by 2 units. Review dose and change if necessary twice a week. If waking sugar is below 100, decrease dose by 2 units.     Please call office if hypoglycemia continues. Office number provided.     Valerie Bernard, YULISSAN, RN  Diabetes Educator

## 2020-01-13 ENCOUNTER — OFFICE VISIT (OUTPATIENT)
Dept: ENDOCRINOLOGY | Facility: CLINIC | Age: 50
End: 2020-01-13

## 2020-01-13 VITALS
SYSTOLIC BLOOD PRESSURE: 132 MMHG | HEART RATE: 132 BPM | WEIGHT: 184 LBS | BODY MASS INDEX: 27.25 KG/M2 | OXYGEN SATURATION: 94 % | HEIGHT: 69 IN | DIASTOLIC BLOOD PRESSURE: 74 MMHG

## 2020-01-13 DIAGNOSIS — F17.200 SMOKING: ICD-10-CM

## 2020-01-13 DIAGNOSIS — E78.5 HYPERLIPIDEMIA, UNSPECIFIED HYPERLIPIDEMIA TYPE: ICD-10-CM

## 2020-01-13 DIAGNOSIS — E11.649 TYPE 2 DIABETES MELLITUS WITH HYPOGLYCEMIA WITHOUT COMA, WITH LONG-TERM CURRENT USE OF INSULIN (HCC): Primary | ICD-10-CM

## 2020-01-13 DIAGNOSIS — I10 ESSENTIAL HYPERTENSION: ICD-10-CM

## 2020-01-13 DIAGNOSIS — Z79.4 TYPE 2 DIABETES MELLITUS WITH HYPOGLYCEMIA WITHOUT COMA, WITH LONG-TERM CURRENT USE OF INSULIN (HCC): Primary | ICD-10-CM

## 2020-01-13 PROCEDURE — 99214 OFFICE O/P EST MOD 30 MIN: CPT | Performed by: NURSE PRACTITIONER

## 2020-01-13 NOTE — PROGRESS NOTES
Subjective    Dionisio Hernándze is a 49 y.o. male. he is here today for follow-up.    History of Present Illness         Referring provider Leon Salas      49 year old male presents for follow up      REASON - diabetes mellitus         Duration/Timing -diagnosed in 2016 / constant         Quality -not controlled         Severity -   Moderate due to complications     Complications -- hypoglycemia and hyperglycemia         Severity (Complication/Hospitalizations)           Secondary Macrovascular -- no CAD, no PAD, no CVA         Secondary Microvascular -- neuropathy in hands, no diabetic retinopathy, no renal disease         Context -- presented for lead poisoning and blood sugar was 900                Diabetes Regimen -- oral medications, insulin         Complications -- hyperglycemia and hypoglycemia      Lab Results   Component Value Date    HGBA1C 12.7 (H) 01/10/2020                   Blood Glucose Readings        Checks 3 to 4 times week              No more low sugars     Lowest was 90 since last visit       Has been on steroids and had an injection of steroids bg was up to 500                         Diet-        Regular diet      Does not drink regular coke or sweet tea           Associated Signs/Symptoms          Hyperglycemic Symptoms: polydipsia       Hypoglycemic Episodes: nocturnal --- sweating       The following portions of the patient's history were reviewed and updated as appropriate:   Past Medical History:   Diagnosis Date   • Cancer (CMS/HCC)    • High blood pressure    • High cholesterol    • Kidney stones    • Osteoporosis    • Stroke (cerebrum) (CMS/HCC)    • Type 2 diabetes mellitus (CMS/HCC)      Past Surgical History:   Procedure Laterality Date   • CARDIAC CATHETERIZATION N/A 5/17/2017    Procedure: Left Heart Cath;  Surgeon: Dinesh Martinez MD;  Location: [facility] CATH INVASIVE LOCATION;  Service:      Family History   Problem Relation Age of Onset   • Hypertension Mother    •  Osteoporosis Mother    • Stroke Father    • Heart attack Father    • Diabetes Father    • Heart disease Sister        Current Outpatient Medications   Medication Sig Dispense Refill   • amitriptyline (ELAVIL) 50 MG tablet Take 50 mg by mouth Every Night.     • aspirin (ASPIRIN ADULT LOW DOSE) 81 MG EC tablet Take 81 mg by mouth Daily.     • atorvastatin (LIPITOR) 40 MG tablet Take 40 mg by mouth Every Night.     • Continuous Blood Gluc  (FREESTYLE LYNDA READER) device 1 each As Needed (glucose monitoring). E10.65 1 Device 1   • Continuous Blood Gluc Sensor (FREESTYLE LYNDA SENSOR SYSTEM) Change every 14 days, E10.65 3 each 11   • diclofenac-miSOPROStol (ARTHROTEC 75) 75-0.2 MG EC tablet Take 1 tablet by mouth 2 (Two) Times a Day.     • Dulaglutide 0.75 MG/0.5ML solution pen-injector Inject 0.75 mg under the skin into the appropriate area as directed 1 (One) Time Per Week. 4 pen 11   • Insulin Lispro, 1 Unit Dial, (HUMALOG KWIKPEN) 100 UNIT/ML solution pen-injector Take 2 units up to 8 units based on carb count TID before meals 2 pen 11   • lansoprazole (PREVACID) 15 MG capsule Take 15 mg by mouth Daily.     • lisinopril (PRINIVIL,ZESTRIL) 5 MG tablet Take 5 mg by mouth Every Morning.     • metFORMIN (GLUCOPHAGE) 1000 MG tablet Take 1,000 mg by mouth 2 (Two) Times a Day.     • sertraline (ZOLOFT) 25 MG tablet Take 50 mg by mouth Daily.     • tamsulosin (FLOMAX) 0.4 MG capsule 24 hr capsule Take 1 capsule by mouth Daily.     • tiZANidine (ZANAFLEX) 4 MG tablet Take 4 mg by mouth At Night As Needed for Muscle Spasms.     • TRESIBA FLEXTOUCH 100 UNIT/ML solution pen-injector injection Inject 12 Units under the skin Every Night.  0     No current facility-administered medications for this visit.      No Known Allergies  Social History     Socioeconomic History   • Marital status: Single     Spouse name: Not on file   • Number of children: Not on file   • Years of education: Not on file   • Highest education  "level: Not on file   Occupational History   • Occupation:    Tobacco Use   • Smoking status: Current Every Day Smoker     Packs/day: 1.50     Years: 34.00     Pack years: 51.00     Types: Cigarettes   • Tobacco comment: Cut down to 3 cigarettes daily   Substance and Sexual Activity   • Alcohol use: Yes     Alcohol/week: 1.0 standard drinks     Types: 1 Cans of beer per week     Comment: fifth of whiskey one time of month   • Drug use: Yes     Types: Marijuana     Comment: daily   • Sexual activity: Defer       Review of Systems  Review of Systems   Constitutional: Negative for activity change, appetite change, diaphoresis and fatigue.   HENT: Negative for facial swelling, sneezing, sore throat, tinnitus, trouble swallowing and voice change.    Eyes: Negative for photophobia, pain, discharge, redness, itching and visual disturbance.   Respiratory: Negative for apnea, cough, choking, chest tightness and shortness of breath.    Cardiovascular: Negative for chest pain, palpitations and leg swelling.   Gastrointestinal: Negative for abdominal distention, abdominal pain, constipation, diarrhea, nausea and vomiting.   Endocrine: Negative for cold intolerance, heat intolerance, polydipsia, polyphagia and polyuria.   Genitourinary: Negative for difficulty urinating, dysuria, frequency, hematuria and urgency.   Musculoskeletal: Negative for arthralgias, back pain, gait problem, joint swelling, myalgias, neck pain and neck stiffness.   Skin: Negative for color change, pallor, rash and wound.   Neurological: Negative for dizziness, tremors, weakness, light-headedness, numbness and headaches.   Hematological: Negative for adenopathy. Does not bruise/bleed easily.   Psychiatric/Behavioral: Negative for behavioral problems, confusion and sleep disturbance.        Objective    /74   Pulse (!) 132   Ht 175.3 cm (69\")   Wt 83.5 kg (184 lb)   SpO2 94%   BMI 27.17 kg/m²   Physical Exam   Constitutional: He is oriented " to person, place, and time. He appears well-developed and well-nourished. No distress.   HENT:   Head: Normocephalic and atraumatic.   Right Ear: External ear normal.   Left Ear: External ear normal.   Nose: Nose normal.   Eyes: Pupils are equal, round, and reactive to light. Conjunctivae and EOM are normal.   Neck: Normal range of motion. Neck supple. No tracheal deviation present. No thyromegaly present.   Cardiovascular: Normal rate, regular rhythm and normal heart sounds.   No murmur heard.  Pulmonary/Chest: Effort normal and breath sounds normal. No respiratory distress. He has no wheezes.   Abdominal: Soft. Bowel sounds are normal. There is no tenderness. There is no rebound and no guarding.   Musculoskeletal: Normal range of motion. He exhibits no edema, tenderness or deformity.   Neurological: He is alert and oriented to person, place, and time. No cranial nerve deficit.   Skin: Skin is warm and dry. No rash noted.   Psychiatric: He has a normal mood and affect. His behavior is normal. Judgment and thought content normal.       Lab Review  Glucose (mg/dL)   Date Value   05/17/2017 194 (H)     Sodium (mmol/L)   Date Value   07/10/2018 137   04/09/2018 137   01/09/2018 135     Potassium (mmol/L)   Date Value   07/10/2018 4.3   04/09/2018 4.3   01/09/2018 4.4     Chloride (mmol/L)   Date Value   07/10/2018 102   04/09/2018 100   01/09/2018 98     CO2 (mmol/L)   Date Value   05/17/2017 27.0     BUN (mg/dL)   Date Value   07/10/2018 19 (H)   04/09/2018 17   01/09/2018 17     Creatinine (mg/dL)   Date Value   07/10/2018 1   04/09/2018 1.1   01/09/2018 1     Hemoglobin A1C (%)   Date Value   01/10/2020 12.7 (H)   09/07/2019 10.6 (H)   05/23/2019 10.7 (H)     Triglycerides (mg/dL)   Date Value   09/07/2019 53   05/23/2019 69   03/02/2019 30     LDL Cholesterol  (mg/dL)   Date Value   09/07/2019 110 (H)   05/23/2019 64   03/02/2019 73       Assessment/Plan      1. Type 2 diabetes mellitus with hypoglycemia without  coma, with long-term current use of insulin (CMS/[facility])    2. Hyperlipidemia, unspecified hyperlipidemia type    3. Essential hypertension    4. Smoking    .    Medications prescribed:  Outpatient Encounter Medications as of 1/13/2020   Medication Sig Dispense Refill   • amitriptyline (ELAVIL) 50 MG tablet Take 50 mg by mouth Every Night.     • aspirin (ASPIRIN ADULT LOW DOSE) 81 MG EC tablet Take 81 mg by mouth Daily.     • atorvastatin (LIPITOR) 40 MG tablet Take 40 mg by mouth Every Night.     • Continuous Blood Gluc  (FREESTYLE LYNDA READER) device 1 each As Needed (glucose monitoring). E10.65 1 Device 1   • Continuous Blood Gluc Sensor (FREESTYLE LYNDA SENSOR SYSTEM) Change every 14 days, E10.65 3 each 11   • diclofenac-miSOPROStol (ARTHROTEC 75) 75-0.2 MG EC tablet Take 1 tablet by mouth 2 (Two) Times a Day.     • Dulaglutide 0.75 MG/0.5ML solution pen-injector Inject 0.75 mg under the skin into the appropriate area as directed 1 (One) Time Per Week. 4 pen 11   • Insulin Lispro, 1 Unit Dial, (HUMALOG KWIKPEN) 100 UNIT/ML solution pen-injector Take 2 units up to 8 units based on carb count TID before meals 2 pen 11   • lansoprazole (PREVACID) 15 MG capsule Take 15 mg by mouth Daily.     • lisinopril (PRINIVIL,ZESTRIL) 5 MG tablet Take 5 mg by mouth Every Morning.     • metFORMIN (GLUCOPHAGE) 1000 MG tablet Take 1,000 mg by mouth 2 (Two) Times a Day.     • sertraline (ZOLOFT) 25 MG tablet Take 50 mg by mouth Daily.     • tamsulosin (FLOMAX) 0.4 MG capsule 24 hr capsule Take 1 capsule by mouth Daily.     • tiZANidine (ZANAFLEX) 4 MG tablet Take 4 mg by mouth At Night As Needed for Muscle Spasms.     • TRESIBA FLEXTOUCH 100 UNIT/ML solution pen-injector injection Inject 12 Units under the skin Every Night.  0     No facility-administered encounter medications on file as of 1/13/2020.        Orders placed during this encounter include:  Orders Placed This Encounter   Procedures   • C-Peptide   • Comprehensive  Metabolic Panel   • Glutamic Acid Decarboxylase   • Comprehensive Metabolic Panel     Standing Status:   Future     Standing Expiration Date:   1/13/2021   • C-Peptide     Standing Status:   Future     Standing Expiration Date:   1/13/2021   • Glutamic Acid Decarboxylase     Standing Status:   Future     Standing Expiration Date:   1/13/2021     Glycemic Management     Diabetes Mellitus      Lab Results   Component Value Date    HGBA1C 12.7 (H) 01/10/2020                   Metformin 1000 po BID --keep         Tresiba taking  25 units                 Humalog      1 unit per 15 grams of CHO ---increase to 2 unit per 15 grams of CHO          Trulicity 0.75 mg once weekly ----stopped due to low sugar and made sick                 Stop Trulicity                    Lipid Management        Lipitor 40 mg one at night      Jan. 2020    LDL - 109         Blood Pressure Management           Lisinopril 5 mg daily               Microvascular Complication Monitoring           Last eye exam --  Overdue --states will make an appt        Foot exam done today         No results found for: CRP        Bone Health              Lab Results   Component Value Date     CALCIUM 9.5 07/10/2018                  Other Diabetes Related Aspects        No results found for: [id number]        Thyroid Health        No results found for: TSH         Preventive Care:     Jasmine     Dionisio Hernández  reports that he has been smoking cigarettes. He has a 51.00 pack-year smoking history. He does not have any smokeless tobacco history on file.. I have educated him on the risk of diseases from using tobacco products such as cancer, COPD and heart diease.     I advised him to quit and he is not willing to quit.    I spent 3  minutes counseling the patient.                      4. Follow-up: Return in about 3 months (around 4/13/2020) for Recheck.

## 2022-03-10 ENCOUNTER — TRANSCRIBE ORDERS (OUTPATIENT)
Dept: LAB | Facility: OTHER | Age: 52
End: 2022-03-10

## 2022-03-10 DIAGNOSIS — E11.649 TYPE 2 DIABETES MELLITUS WITH HYPOGLYCEMIA WITHOUT COMA, WITH LONG-TERM CURRENT USE OF INSULIN: Primary | ICD-10-CM

## 2022-03-10 DIAGNOSIS — Z79.4 TYPE 2 DIABETES MELLITUS WITH HYPOGLYCEMIA WITHOUT COMA, WITH LONG-TERM CURRENT USE OF INSULIN: Primary | ICD-10-CM

## 2022-04-07 ENCOUNTER — TRANSCRIBE ORDERS (OUTPATIENT)
Dept: LAB | Facility: OTHER | Age: 52
End: 2022-04-07

## 2022-04-07 DIAGNOSIS — E11.65 TYPE II DIABETES MELLITUS WITH HYPEROSMOLARITY, UNCONTROLLED: Primary | ICD-10-CM

## 2022-04-07 DIAGNOSIS — E11.00 TYPE II DIABETES MELLITUS WITH HYPEROSMOLARITY, UNCONTROLLED: Primary | ICD-10-CM

## 2022-09-20 ENCOUNTER — OFFICE VISIT (OUTPATIENT)
Dept: CARDIOLOGY | Facility: CLINIC | Age: 52
End: 2022-09-20

## 2022-09-20 VITALS
HEIGHT: 69 IN | BODY MASS INDEX: 27.86 KG/M2 | SYSTOLIC BLOOD PRESSURE: 146 MMHG | OXYGEN SATURATION: 99 % | WEIGHT: 188.1 LBS | HEART RATE: 64 BPM | DIASTOLIC BLOOD PRESSURE: 90 MMHG

## 2022-09-20 DIAGNOSIS — R07.9 CHEST PAIN, UNSPECIFIED TYPE: Primary | ICD-10-CM

## 2022-09-20 DIAGNOSIS — I20.8 EXERTIONAL ANGINA: ICD-10-CM

## 2022-09-20 DIAGNOSIS — Z79.4 TYPE 2 DIABETES MELLITUS WITH HYPOGLYCEMIA WITHOUT COMA, WITH LONG-TERM CURRENT USE OF INSULIN: ICD-10-CM

## 2022-09-20 DIAGNOSIS — Z71.6 TOBACCO ABUSE COUNSELING: ICD-10-CM

## 2022-09-20 DIAGNOSIS — R42 DIZZINESS: ICD-10-CM

## 2022-09-20 DIAGNOSIS — E78.2 MIXED HYPERLIPIDEMIA: ICD-10-CM

## 2022-09-20 DIAGNOSIS — E11.649 TYPE 2 DIABETES MELLITUS WITH HYPOGLYCEMIA WITHOUT COMA, WITH LONG-TERM CURRENT USE OF INSULIN: ICD-10-CM

## 2022-09-20 DIAGNOSIS — I10 ESSENTIAL HYPERTENSION: ICD-10-CM

## 2022-09-20 PROBLEM — I20.89 EXERTIONAL ANGINA: Status: ACTIVE | Noted: 2022-09-20

## 2022-09-20 LAB
QT INTERVAL: 416 MS
QTC INTERVAL: 429 MS

## 2022-09-20 PROCEDURE — 99243 OFF/OP CNSLTJ NEW/EST LOW 30: CPT | Performed by: INTERNAL MEDICINE

## 2022-09-20 PROCEDURE — 93000 ELECTROCARDIOGRAM COMPLETE: CPT | Performed by: INTERNAL MEDICINE

## 2022-09-20 RX ORDER — ASPIRIN 81 MG/1
81 TABLET ORAL DAILY
Qty: 90 TABLET | Refills: 3
Start: 2022-09-20

## 2022-09-20 RX ORDER — LOSARTAN POTASSIUM 50 MG/1
50 TABLET ORAL DAILY
Qty: 90 TABLET | Refills: 3 | Status: SHIPPED | OUTPATIENT
Start: 2022-09-20

## 2022-09-20 NOTE — PROGRESS NOTES
Dionisio Hernández  52 y.o. male    09/20/2022  1. Chest pain, unspecified type    2. Exertional angina (HCC)    3. Essential hypertension    4. Type 2 diabetes mellitus with hypoglycemia without coma, with long-term current use of insulin (HCC)    5. Tobacco abuse counseling    6. Mixed hyperlipidemia    7. Dizziness        History of Present Illness  Dionisio Hernández is a 52-year-old male with multiple medical issues which include difficult control diabetes mellitus, hypertension, hyperlipidemia, tobacco abuse and known previous documented coronary artery disease who is being seen by me for the first time.  He presents for evaluation of intermittent episodes of chest pain with been going on for about 3 to 4 months.  He has also had pain in the back of the neck sometimes radiating down his left arm not always related to chest pain.  He has had episodes of dizziness and syncope which goes back several years and he has had work-up for this in the past.  CT scan of the brain in August 2022 did not reveal any acute abnormalities.  The patient has had episodes of diabetic ketoacidosis in the past for which he has been admitted to [facility].    Back in March 2017 he was evaluated for chest pain and had an abnormal stress test that led to a cardiac catheterization by Dr. Martinez in May 2017 which showed the following findings:     CORONARY ANGIOGRAPHY: Right dominant coronary artery system.  Left main: The left main coronary artery originates from the left coronary sinus.  It divides into LAD and left circumflex.  The left main is free of obstructive disease.  LAD: The left anterior descending coronary artery is a decent caliber vessel.  It courses along the anterior interventricular groove.  It extends although down to the apex.  The LAD gives rise to 3 diagonal branches that originate relatively high in the LAD.  What appears to be a third diagonal branch is relatively long but small in caliber.  It gives a second  subbranch.  The mid segment of the diagonal branch has approximately 80% stenosis which is approximately 20 mm in length. The LAD is free of obstructive disease.  Left circumflex: The left circumflex is a medium caliber vessel.  It courses along the anterior AV groove.  It gives rise to a marginal branch proximally.  The left circumflex is free of obstructive stenosis.   RCA: The right coronary artery originates from the right coronary sinus itself medium caliber vessel.  It courses along the posterior AV groove.  It gives rise to PDA and 2 posterolateral branches.  Mid to proximal segment has approximately 50-70% stenosis.    PDA:The PDA appears to be free of obstructive disease.     Patient was treated medically at that time.    EKG today showed sinus rhythm with heart rate of 64 bpm.  No ST-T wave changes diagnostic of ischemia.  Leftward axis.    SUBJECTIVE    Allergies   Allergen Reactions   • Dulaglutide Angioedema and Swelling         Past Medical History:   Diagnosis Date   • Cancer (HCC)    • High blood pressure    • High cholesterol    • Kidney stones    • Osteoporosis    • Stroke (cerebrum) ([facility])    • Type 2 diabetes mellitus ([facility])          Past Surgical History:   Procedure Laterality Date   • CARDIAC CATHETERIZATION N/A 05/17/2017    Procedure: Left Heart Cath;  Surgeon: Dinesh Martinez MD;  Location: [facility] CATH INVASIVE LOCATION;  Service:    • FINGER SURGERY     • LEG SURGERY           Family History   Problem Relation Age of Onset   • Hypertension Mother    • Osteoporosis Mother    • Stroke Father    • Heart attack Father    • Diabetes Father    • Heart disease Sister          Social History     Socioeconomic History   • Marital status:    Tobacco Use   • Smoking status: Current Every Day Smoker     Packs/day: 1.00     Years: 34.00     Pack years: 34.00     Types: Cigarettes   • Smokeless tobacco: Former User     Types: Snuff     Quit date: 2015   Vaping Use   • Vaping Use: Former   •  "Substances: Nicotine   • Devices: Disposable   Substance and Sexual Activity   • Alcohol use: Yes     Alcohol/week: 1.0 standard drink     Types: 1 Cans of beer per week     Comment: fifth of whiskey one time of month   • Drug use: Not Currently     Types: Marijuana     Comment: daily   • Sexual activity: Defer         Current Outpatient Medications   Medication Sig Dispense Refill   • amitriptyline (ELAVIL) 50 MG tablet Take 50 mg by mouth Every Night.     • atorvastatin (LIPITOR) 40 MG tablet Take 40 mg by mouth Every Night.     • Continuous Blood Gluc  (FREESTYLE LYNDA READER) device 1 each As Needed (glucose monitoring). E10.65 1 Device 1   • Continuous Blood Gluc Sensor (FREESTYLE LYNDA SENSOR SYSTEM) Change every 14 days, E10.65 3 each 11   • Insulin Glargine (LANTUS SOLOSTAR) 100 UNIT/ML injection pen 50 units sub q q am, 40 qhs     • sertraline (ZOLOFT) 25 MG tablet Take 50 mg by mouth Daily.     • tamsulosin (FLOMAX) 0.4 MG capsule 24 hr capsule Take 1 capsule by mouth Daily.     • aspirin (aspirin) 81 MG EC tablet Take 1 tablet by mouth Daily. 90 tablet 3   • Insulin Lispro-aabc (LYUMJEV KWIKPEN SC) Inject  under the skin into the appropriate area as directed. Sliding scale     • losartan (Cozaar) 50 MG tablet Take 1 tablet by mouth Daily. 90 tablet 3   • METHOCARBAMOL PO Take 500 mg by mouth 3 (Three) Times a Day.       No current facility-administered medications for this visit.         OBJECTIVE    /90 (BP Location: Left arm, Patient Position: Sitting, Cuff Size: Adult)   Pulse 64   Ht 175.3 cm (69\")   Wt 85.3 kg (188 lb 1.6 oz)   SpO2 99%   BMI 27.78 kg/m²         Review of Systems     Constitutional:  Denies recent weight loss, weight gain, fever or chills     HENT:  Denies any hearing loss, epistaxis, hoarseness, or difficulty speaking.     Eyes: Wears eyeglasses or contact lenses     Respiratory:  Denies dyspnea with exertion, no cough, wheezing, or hemoptysis.     Cardiovascular: " See HPI    Gastrointestinal:  Denies change in bowel habits, dyspepsia, ulcer disease, hematochezia, or melena.     Endocrine: Negative for cold intolerance, heat intolerance, polydipsia, polyphagia and polyuria.  Diabetes not under control, has hyperlipidemia    Genitourinary: Negative.  History of renal stones in the past      Musculoskeletal: Denies any history of arthritic symptoms or back problems     Skin:  Denies any change in hair or nails, rashes, or skin lesions.     Allergic/Immunologic: Negative.  Negative for environmental allergies, food allergies and/or immunocompromised state.     Neurological:  Denies any history of recurrent headaches, strokes, TIA, or seizure disorder.  History of dizziness/syncope    Hematological: Denies any food allergies, seasonal allergies, bleeding disorders, or lymphadenopathy.     Psychiatric/Behavioral: Denies any history of depression, substance abuse, or change in cognitive function.     Physical Exam     Constitutional: Cooperative, alert and oriented, in no acute distress.     HENT:   Head: Normocephalic, normal hair patterns, no masses or tenderness.  Ears, Nose, and Throat: No gross abnormalities. No pallor or cyanosis. Dentition good.   Eyes: EOMS intact, PERRL, conjunctivae and lids unremarkable. Fundoscopic exam and visual fields not performed.   Neck: No palpable masses or adenopathy, no thyromegaly, no JVD, carotid pulses are full and equal bilaterally and without bruit.     Cardiovascular: Regular rhythm, S1 and S2 normal, no S3 or S4.  No murmurs, gallops, or rubs detected.     Pulmonary/Chest: Chest: normal symmetry, normal respiratory excursion, no intercostal retraction, no use of accessory muscles.     Pulmonary: Normal breath sounds. No rales or rhonchi.    Abdominal: Abdomen soft, bowel sounds normoactive, no masses, no hepatosplenomegaly, nontender, no bruit.     Musculoskeletal: No deformities, clubbing, cyanosis, erythema, or edema observed.      Neurological: No gross motor or sensory deficits noted, affect appropriate, oriented to time, person, place.     Skin: Warm and dry to the touch, no apparent skin lesion or mass noted.     Psychiatric: He has a normal mood and affect. His behavior is normal. Judgment and thought content normal.         Procedures      Lab Results   Component Value Date    WBC 15.4 (H) 10/30/2019    HGB 13.1 (L) 10/30/2019    HCT 38.5 (L) 10/30/2019    MCV 90 10/30/2019     10/30/2019     Lab Results   Component Value Date    GLUCOSE 194 (H) 05/17/2017    BUN 14 07/26/2022    CREATININE 1.0 07/26/2022    EGFRIFNONA 109 05/17/2017    EGFRIFAFRI 107 08/03/2021    BCR 19.7 05/17/2017    CO2 29 07/26/2022    CALCIUM 8.6 07/26/2022    ALBUMIN 4.0 07/26/2022    AST 13 07/26/2022    ALT 12 07/26/2022     Lab Results   Component Value Date    CHOL 162 04/30/2021    CHOL 155 01/21/2021    CHOL 159 08/25/2020     Lab Results   Component Value Date    TRIG 57 11/12/2021    TRIG 51 08/03/2021    TRIG 57 04/30/2021     Lab Results   Component Value Date    HDL 41 11/12/2021    HDL 45 08/03/2021    HDL 42 04/30/2021     No components found for: LDLCALC  Lab Results   Component Value Date     11/12/2021    LDL 81 08/03/2021    LDL 96 04/30/2021     No results found for: HDLLDLRATIO  No components found for: CHOLHDL  Lab Results   Component Value Date    HGBA1C 9.1 (H) 07/26/2022     Lab Results   Component Value Date    TSH 2.38 07/26/2022           ASSESSMENT AND PLAN  Dionisio Hernández is a 52-year-old male with multiple risk factors for coronary artery disease including longstanding diabetes mellitus which has been difficult to control including diabetic ketoacidosis in the past, hypertension, hyperlipidemia, tobacco abuse, positive family history for coronary artery disease and documented moderate CAD by cardiac catheterization in 2017.  He is being evaluated for exertional chest pain suspicious for angina.    Different treatment  options were discussed with him and I believe that definitive evaluation by cardiac catheterization would be appropriate in this situation.  He has had a positive stress test in the past.  His symptoms have been worsening during the past 3 to 4 months.  All risks and benefits have been explained to him and he has been scheduled for cardiac catheterization by Dr. Fagan next week.  All questions were answered to his satisfaction.  He is not allergic to iodine.  He will be ASA II and Mallampati II.    For optimization of blood pressure have started him on losartan 50 mg daily.  Antiplatelet therapy with aspirin and lipid-lowering therapy with atorvastatin has been continued.  He will coordinate with his endocrinologist for optimization of diabetes mellitus.  Aggressive risk factor modification has been recommended and he has been advised to watch his diet closely.  Smoking cessation was stressed.  An echocardiogram to assess left ventricular and valvular function have not arranged.    Thank you for asked me to see this patient.    Diagnoses and all orders for this visit:    1. Chest pain, unspecified type (Primary)  -     ECG 12 Lead    2. Exertional angina (HCC)    3. Essential hypertension    4. Type 2 diabetes mellitus with hypoglycemia without coma, with long-term current use of insulin (HCC)    5. Tobacco abuse counseling    6. Mixed hyperlipidemia    7. Dizziness    Other orders  -     aspirin (aspirin) 81 MG EC tablet; Take 1 tablet by mouth Daily.  Dispense: 90 tablet; Refill: 3  -     losartan (Cozaar) 50 MG tablet; Take 1 tablet by mouth Daily.  Dispense: 90 tablet; Refill: 3        BMI is >= 25 and <30. (Overweight) The following options were offered after discussion;: nutrition counseling/recommendations      Dionisio SARABIA Edgar  reports that he has been smoking cigarettes. He has a 34.00 pack-year smoking history. He quit smokeless tobacco use about 7 years ago.  His smokeless tobacco use included snuff.. I  have educated him on the risk of diseases from using tobacco products such as cancer, COPD and heart disease.       I spent 3  minutes counseling the patient.               Libra Goldberg MD  9/20/2022  09:44 CDT

## 2022-09-21 ENCOUNTER — PREP FOR SURGERY (OUTPATIENT)
Dept: OTHER | Facility: HOSPITAL | Age: 52
End: 2022-09-21

## 2022-09-21 DIAGNOSIS — I20.0 UNSTABLE ANGINA: Primary | ICD-10-CM

## 2022-09-21 RX ORDER — SODIUM CHLORIDE 0.9 % (FLUSH) 0.9 %
3 SYRINGE (ML) INJECTION EVERY 12 HOURS SCHEDULED
Status: CANCELLED | OUTPATIENT
Start: 2022-09-27

## 2022-09-21 RX ORDER — SODIUM CHLORIDE 0.9 % (FLUSH) 0.9 %
10 SYRINGE (ML) INJECTION AS NEEDED
Status: CANCELLED | OUTPATIENT
Start: 2022-09-27

## 2022-09-22 ENCOUNTER — TELEPHONE (OUTPATIENT)
Dept: CARDIOLOGY | Facility: CLINIC | Age: 52
End: 2022-09-22

## 2022-09-22 PROBLEM — I20.0 UNSTABLE ANGINA (HCC): Status: ACTIVE | Noted: 2022-09-22

## 2022-09-22 NOTE — TELEPHONE ENCOUNTER
Patient contacted with details regarding a left heart cath. He has been scheduled for cath on 9'27/22.  The patient will hold insulin the morning of the heart cath. He will contact the doctor who provides his insulin for adjustments the day before. All other instructions were discussed with the patient.

## 2022-09-26 PROCEDURE — S0260 H&P FOR SURGERY: HCPCS | Performed by: INTERNAL MEDICINE

## 2022-09-26 NOTE — H&P
[facility] Cardiology  HISTORY AND PHYSICAL  Dionisio Hernández  52 y.o. male    Chief complaint -  Chest pain    History of Present Illness:  This is a 52-year-old gentleman with known history of diabetes on insulin, hypertension, hyperlipidemia, active tobacco use with known history of coronary artery disease.  He had a cardiac cath after an abnormal stress test back in 2017.  He had mild diffuse disease throughout his coronary arteries and was found to have obstructive disease in a small caliber but long diagonal which was treated conservatively at that time.  Is recently seen by Dr. Ashraf for worsening exertional chest pain, with his known history of coronary artery disease and multiple risk factors he was recommended invasive evaluation for definitive diagnosis.  Has significant dizziness at baseline limiting anti-anginal options.       Allergies   Allergen Reactions   • Dulaglutide Angioedema and Swelling         Past Medical History:   Diagnosis Date   • Cancer ([facility])    • High blood pressure    • High cholesterol    • Kidney stones    • Osteoporosis    • Stroke (cerebrum) ([facility])    • Type 2 diabetes mellitus ([facility])          Past Surgical History:   Procedure Laterality Date   • CARDIAC CATHETERIZATION N/A 05/17/2017    Procedure: Left Heart Cath;  Surgeon: Dinesh Martinez MD;  Location: [facility] CATH INVASIVE LOCATION;  Service:    • FINGER SURGERY     • LEG SURGERY           Family History   Problem Relation Age of Onset   • Hypertension Mother    • Osteoporosis Mother    • Stroke Father    • Heart attack Father    • Diabetes Father    • Heart disease Sister          Social History     Socioeconomic History   • Marital status:    Tobacco Use   • Smoking status: Current Every Day Smoker     Packs/day: 1.00     Years: 34.00     Pack years: 34.00     Types: Cigarettes   • Smokeless tobacco: Former User     Types: Snuff     Quit date: 2015   Vaping Use   • Vaping Use: Former   • Substances:  Nicotine   • Devices: Disposable   Substance and Sexual Activity   • Alcohol use: Yes     Alcohol/week: 1.0 standard drink     Types: 1 Cans of beer per week     Comment: fifth of whiskey one time of month   • Drug use: Not Currently     Types: Marijuana     Comment: daily   • Sexual activity: Defer         Prior to Admission medications    Medication Sig Start Date End Date Taking? Authorizing Provider   amitriptyline (ELAVIL) 50 MG tablet Take 50 mg by mouth Every Night.    Radha Santos MD   aspirin (aspirin) 81 MG EC tablet Take 1 tablet by mouth Daily. 9/20/22   Libra Goldberg MD   atorvastatin (LIPITOR) 40 MG tablet Take 40 mg by mouth Every Night. 8/17/16   Radha Santos MD   Continuous Blood Gluc  (FREESTYLE LYNDA READER) device 1 each As Needed (glucose monitoring). E10.65 11/18/19   Trace Tian APRN   Continuous Blood Gluc Sensor (FREESTYLE LYNDA SENSOR SYSTEM) Change every 14 days, E10.65 11/18/19   Trace Tian APRN   Insulin Glargine (LANTUS SOLOSTAR) 100 UNIT/ML injection pen 50 units sub q q am, 40 qhs 5/21/22   Radha Santos MD   Insulin Lispro-aabc (LYUMJEV KWIKPEN SC) Inject  under the skin into the appropriate area as directed. Sliding scale    Radha Santos MD   losartan (Cozaar) 50 MG tablet Take 1 tablet by mouth Daily. 9/20/22   Libra Goldberg MD   METHOCARBAMOL PO Take 500 mg by mouth 3 (Three) Times a Day.    Radha Santos MD   sertraline (ZOLOFT) 25 MG tablet Take 50 mg by mouth Daily. 9/23/16   Radha Santos MD   tamsulosin (FLOMAX) 0.4 MG capsule 24 hr capsule Take 1 capsule by mouth Daily.    Radha Santos MD         Review of Systems:     Constitution: Denies any fatigue, fever or chills.  HENT: Denies any headache, hearing impairment.  Eyes: Denies any blurring of vision, or photophobia.  Cardiovascular:  As per history of present illness.   Respiratory system: Denies any  "COPD, shortness of breath.  Endocrine:  No history of hyperlipidemia, diabetes.  Musculoskeletal:  No history of arthritis with musculoskeletal problems.  Gastrointestinal: No nausea, vomiting, or melena.  Genitourinary: No dysuria or hematuria.  Neurological: No history of seizure disorder, stroke, or memory problems.    Psychiatric/Behavioral: No history of depression, bipolar disorder or schizophrenia .    Hematological: No history of easy bruising.    ROS          OBJECTIVE:    /94 (BP Location: Right arm, Patient Position: Sitting)   Pulse 67   Temp 96.9 °F (36.1 °C) (Temporal)   Resp 18   Ht 175.3 cm (69\")   Wt 83.7 kg (184 lb 8.4 oz)   SpO2 97%   BMI 27.25 kg/m²       Physical Exam:   Constitutional: Patient is oriented to person, place, and time.   Skin: Warm and dry.  Well developed and nourished in no acute distress .  Head: Normocephalic and atraumatic.   Eyes: Pupils are equal, round, and reactive to light.   Neck: Neck supple. No bruit in the carotids, no elevation of JVD.  Cardiovascular: [place] in the fifth intercostal space, regular rate, and rhythm,  S1 greater than S2, no S3 or S4, no gallop.  Pulmonary/Chest: Air entry is equal on both sides.  No wheezing or crackles.  Abdominal: Soft.  No hepatosplenomegaly, bowel sounds are present.  Musculoskeletal: No kyphoscoliosis.  Neurological: Alert and oriented to person, place, and time.  Cranial nerves are intact. No motor or sensory deficit.  Extremities: No edema, no radial femoral delay.  Psychiatric: Normal mood and affect. Behavior is normal.      Lab Results (last 24 hours)     Procedure Component Value Units Date/Time    CBC (No Diff) [[id number]]  (Abnormal) Collected: 09/27/22 0659    Specimen: Blood Updated: 09/27/22 0718     WBC 5.19 10*3/mm3      RBC 4.71 10*6/mm3      Hemoglobin 14.3 g/dL      Hematocrit 39.7 %      MCV 84.3 fL      MCH 30.4 pg      MCHC 36.0 g/dL      RDW 12.7 %      RDW-SD 39.3 fl      MPV 10.4 fL      " Platelets 230 10*3/mm3     Comprehensive Metabolic Panel [[id number]]  (Abnormal) Collected: 09/27/22 0659    Specimen: Blood Updated: 09/27/22 0741     Glucose 231 mg/dL      BUN 13 mg/dL      Creatinine 0.87 mg/dL      Sodium 138 mmol/L      Potassium 3.8 mmol/L      Chloride 104 mmol/L      CO2 27.0 mmol/L      Calcium 8.9 mg/dL      Total Protein 6.2 g/dL      Albumin 3.80 g/dL      ALT (SGPT) 13 U/L      AST (SGOT) 14 U/L      Alkaline Phosphatase 104 U/L      Total Bilirubin 0.2 mg/dL      Globulin 2.4 gm/dL      A/G Ratio 1.6 g/dL      BUN/Creatinine Ratio 14.9     Anion Gap 7.0 mmol/L      eGFR 103.2 mL/min/1.73      Comment: National Kidney Foundation and American Society of Nephrology (ASN) Task Force recommended calculation based on the Chronic Kidney Disease Epidemiology Collaboration (CKD-EPI) equation refit without adjustment for race.       Narrative:      GFR Normal >60  Chronic Kidney Disease <60  Kidney Failure <15      Protime-INR [[id number]]  (Normal) Collected: 09/27/22 0659    Specimen: Blood Updated: 09/27/22 0744     Protime 13.6 Seconds      INR 1.05    Narrative:      Therapeutic range for most indications is 2.0-3.0 INR,  or 2.5-3.5 for mechanical heart valves.          Results for orders placed in visit on 03/07/17    Adult Transthoracic Echo Complete    Interpretation Summary  · Left ventricular function is normal.  · Left ventricular diastolic (grade I) consistent with impaired relaxation.  · Calculated EF = 54.9%      The 10-year ASCVD risk score (Seagrove DC Jr., et al., 2013) is: 16.6%    Values used to calculate the score:      Age: 52 years      Sex: Male      Is Non- : No      Diabetic: Yes      Tobacco smoker: Yes      Systolic Blood Pressure: 125 mmHg      Is BP treated: Yes      HDL Cholesterol: 41 mg/dL      Total Cholesterol: 153 mg/dL          A/P:    [facility] Pre-Op:    Invasive coronary angiography was recommended to the patient.  The patientdenies  bleeding  issues. The patient reports use of antiplatelet agents.  The patient denies  CKD. The patient denies  contrast allergy. The patient reports use of diabetes medications.    Pre-Cath Surgical History: NA    The indications, risks/benefits and alternatives of diagnostic left heart cardiac catheterization, angiography, conscious sedation, and possible blood transfusion were discussed in detail with the patient. The potential complications of 1/2000 chance of death, 1/1000 chance of heart attack or stroke, 1/500 chance of bleeding or clotting of the femoral artery, and 1/500 chance of allergic reaction to contrast were discussed. We also reviewed possible complications of infection and kidney dysfunction. If PCI were performed and intra-coronary stents indicated, we discussed the details about ARANZA. This included a review of the risks of the infrequent, but relatively higher incidence of late thrombosis with ARANZA. The importance of maintaining a consistent daily regimen of aspirin and an additional anti-platelet agent for as long as directed after implantation was emphasized. No contraindications were found. The patient  appeared to understand and agreed to the above.  -Left heart catheterization, Coronary angiography, Graft angiography, In-situ LIMA angiography, Left ventriculography, Intravascular ultrasound, Optical coherence tomography, Flow wire, Balloon Angioplasty, Coronary stent, Graft stent, Aortography, Iliofemoral angiography, Device closure, femoral artery, Intra-aortic balloon pump, Impella LV assist device implantation, Transvenous pacemaker, Pericardiocentesis and Subclavian angiography  -hold oral diabetic medications the morning of surgery      ASA Class: II  Mallampati Score: II      Contraindications to DAPT: None        BMI is >= 25 and <30. (Overweight) The following options were offered after discussion;: exercise counseling/recommendations      Dionisio Hernández  reports that he has been smoking  cigarettes. He has a 34.00 pack-year smoking history. He quit smokeless tobacco use about 7 years ago.  His smokeless tobacco use included snuff.. I have educated him on the risk of diseases from using tobacco products such as cancer, COPD and heart disease.     I advised him to quit and he is not willing to quit.    I spent 3  minutes counseling the patient.           Arslan Fagan MD  9/27/2022  08:17 CDT      Part of this note may be an electronic transcription/translation of spoken language to printed text using the Dragon Dictation System.

## 2022-09-27 ENCOUNTER — HOSPITAL ENCOUNTER (OUTPATIENT)
Facility: HOSPITAL | Age: 52
Setting detail: HOSPITAL OUTPATIENT SURGERY
Discharge: HOME | End: 2022-09-27
Attending: INTERNAL MEDICINE | Admitting: INTERNAL MEDICINE

## 2022-09-27 VITALS
TEMPERATURE: 97 F | OXYGEN SATURATION: 99 % | WEIGHT: 184.53 LBS | SYSTOLIC BLOOD PRESSURE: 139 MMHG | DIASTOLIC BLOOD PRESSURE: 83 MMHG | BODY MASS INDEX: 27.33 KG/M2 | HEART RATE: 63 BPM | HEIGHT: 69 IN | RESPIRATION RATE: 16 BRPM

## 2022-09-27 DIAGNOSIS — I20.0 UNSTABLE ANGINA: ICD-10-CM

## 2022-09-27 LAB
ALBUMIN SERPL-MCNC: 3.8 G/DL (ref 3.5–5.2)
ALBUMIN/GLOB SERPL: 1.6 G/DL
ALP SERPL-CCNC: 104 U/L (ref 39–117)
ALT SERPL W P-5'-P-CCNC: 13 U/L (ref 1–41)
ANION GAP SERPL CALCULATED.3IONS-SCNC: 7 MMOL/L (ref 5–15)
AST SERPL-CCNC: 14 U/L (ref 1–40)
BILIRUB SERPL-MCNC: 0.2 MG/DL (ref 0–1.2)
BUN SERPL-MCNC: 13 MG/DL (ref 6–20)
BUN/CREAT SERPL: 14.9 (ref 7–25)
CALCIUM SPEC-SCNC: 8.9 MG/DL (ref 8.6–10.5)
CHLORIDE SERPL-SCNC: 104 MMOL/L (ref 98–107)
CO2 SERPL-SCNC: 27 MMOL/L (ref 22–29)
CREAT SERPL-MCNC: 0.87 MG/DL (ref 0.76–1.27)
DEPRECATED RDW RBC AUTO: 39.3 FL (ref 37–54)
EGFRCR SERPLBLD CKD-EPI 2021: 103.2 ML/MIN/1.73
ERYTHROCYTE [DISTWIDTH] IN BLOOD BY AUTOMATED COUNT: 12.7 % (ref 12.3–15.4)
GLOBULIN UR ELPH-MCNC: 2.4 GM/DL
GLUCOSE SERPL-MCNC: 231 MG/DL (ref 65–99)
HCT VFR BLD AUTO: 39.7 % (ref 37.5–51)
HGB BLD-MCNC: 14.3 G/DL (ref 13–17.7)
INR PPP: 1.05 (ref 0.8–1.2)
MCH RBC QN AUTO: 30.4 PG (ref 26.6–33)
MCHC RBC AUTO-ENTMCNC: 36 G/DL (ref 31.5–35.7)
MCV RBC AUTO: 84.3 FL (ref 79–97)
PLATELET # BLD AUTO: 230 10*3/MM3 (ref 140–450)
PMV BLD AUTO: 10.4 FL (ref 6–12)
POTASSIUM SERPL-SCNC: 3.8 MMOL/L (ref 3.5–5.2)
PROT SERPL-MCNC: 6.2 G/DL (ref 6–8.5)
PROTHROMBIN TIME: 13.6 SECONDS (ref 11.1–15.3)
RBC # BLD AUTO: 4.71 10*6/MM3 (ref 4.14–5.8)
SODIUM SERPL-SCNC: 138 MMOL/L (ref 136–145)
WBC NRBC COR # BLD: 5.19 10*3/MM3 (ref 3.4–10.8)

## 2022-09-27 PROCEDURE — 25010000002 MIDAZOLAM PER 1 MG: Performed by: INTERNAL MEDICINE

## 2022-09-27 PROCEDURE — C1769 GUIDE WIRE: HCPCS | Performed by: INTERNAL MEDICINE

## 2022-09-27 PROCEDURE — 25010000002 FENTANYL CITRATE (PF) 50 MCG/ML SOLUTION: Performed by: INTERNAL MEDICINE

## 2022-09-27 PROCEDURE — 93458 L HRT ARTERY/VENTRICLE ANGIO: CPT | Performed by: INTERNAL MEDICINE

## 2022-09-27 PROCEDURE — 85610 PROTHROMBIN TIME: CPT | Performed by: INTERNAL MEDICINE

## 2022-09-27 PROCEDURE — 0 IOPAMIDOL PER 1 ML: Performed by: INTERNAL MEDICINE

## 2022-09-27 PROCEDURE — 25010000002 HEPARIN (PORCINE) PER 1000 UNITS: Performed by: INTERNAL MEDICINE

## 2022-09-27 PROCEDURE — 85027 COMPLETE CBC AUTOMATED: CPT | Performed by: INTERNAL MEDICINE

## 2022-09-27 PROCEDURE — 80053 COMPREHEN METABOLIC PANEL: CPT | Performed by: INTERNAL MEDICINE

## 2022-09-27 PROCEDURE — C1894 INTRO/SHEATH, NON-LASER: HCPCS | Performed by: INTERNAL MEDICINE

## 2022-09-27 RX ORDER — ATORVASTATIN CALCIUM 80 MG/1
80 TABLET, FILM COATED ORAL NIGHTLY
Qty: 30 TABLET | Refills: 0 | Status: SHIPPED | OUTPATIENT
Start: 2022-09-27 | End: 2022-11-01

## 2022-09-27 RX ORDER — RANOLAZINE 500 MG/1
500 TABLET, EXTENDED RELEASE ORAL 2 TIMES DAILY
Qty: 30 TABLET | Refills: 0 | Status: SHIPPED | OUTPATIENT
Start: 2022-09-27

## 2022-09-27 RX ORDER — MIDAZOLAM HYDROCHLORIDE 1 MG/ML
INJECTION INTRAMUSCULAR; INTRAVENOUS AS NEEDED
Status: DISCONTINUED | OUTPATIENT
Start: 2022-09-27 | End: 2022-09-27 | Stop reason: HOSPADM

## 2022-09-27 RX ORDER — SODIUM CHLORIDE 9 MG/ML
25 INJECTION, SOLUTION INTRAVENOUS CONTINUOUS
Status: DISCONTINUED | OUTPATIENT
Start: 2022-09-27 | End: 2022-09-27 | Stop reason: HOSPADM

## 2022-09-27 RX ORDER — HEPARIN SODIUM 1000 [USP'U]/ML
INJECTION, SOLUTION INTRAVENOUS; SUBCUTANEOUS AS NEEDED
Status: DISCONTINUED | OUTPATIENT
Start: 2022-09-27 | End: 2022-09-27 | Stop reason: HOSPADM

## 2022-09-27 RX ORDER — SODIUM CHLORIDE 0.9 % (FLUSH) 0.9 %
10 SYRINGE (ML) INJECTION AS NEEDED
Status: DISCONTINUED | OUTPATIENT
Start: 2022-09-27 | End: 2022-09-27 | Stop reason: HOSPADM

## 2022-09-27 RX ORDER — FENTANYL CITRATE 50 UG/ML
INJECTION, SOLUTION INTRAMUSCULAR; INTRAVENOUS AS NEEDED
Status: DISCONTINUED | OUTPATIENT
Start: 2022-09-27 | End: 2022-09-27 | Stop reason: HOSPADM

## 2022-09-27 RX ORDER — SODIUM CHLORIDE 0.9 % (FLUSH) 0.9 %
3 SYRINGE (ML) INJECTION EVERY 12 HOURS SCHEDULED
Status: DISCONTINUED | OUTPATIENT
Start: 2022-09-27 | End: 2022-09-27 | Stop reason: HOSPADM

## 2022-09-27 RX ORDER — LIDOCAINE HYDROCHLORIDE 20 MG/ML
INJECTION, SOLUTION INFILTRATION; PERINEURAL AS NEEDED
Status: DISCONTINUED | OUTPATIENT
Start: 2022-09-27 | End: 2022-09-27 | Stop reason: HOSPADM

## 2022-09-27 RX ORDER — NITROGLYCERIN 5 MG/ML
INJECTION, SOLUTION INTRAVENOUS AS NEEDED
Status: DISCONTINUED | OUTPATIENT
Start: 2022-09-27 | End: 2022-09-27 | Stop reason: HOSPADM

## 2022-09-27 RX ORDER — ACETAMINOPHEN 325 MG/1
650 TABLET ORAL EVERY 4 HOURS PRN
Status: DISCONTINUED | OUTPATIENT
Start: 2022-09-27 | End: 2022-09-27 | Stop reason: HOSPADM

## 2022-09-27 RX ORDER — AMLODIPINE BESYLATE 5 MG/1
5 TABLET ORAL DAILY
Qty: 30 TABLET | Refills: 0 | Status: SHIPPED | OUTPATIENT
Start: 2022-09-27 | End: 2022-11-01

## 2022-09-27 RX ORDER — SODIUM CHLORIDE 9 MG/ML
125 INJECTION, SOLUTION INTRAVENOUS CONTINUOUS
Status: DISCONTINUED | OUTPATIENT
Start: 2022-09-27 | End: 2022-09-27 | Stop reason: HOSPADM

## 2022-09-27 RX ADMIN — SODIUM CHLORIDE 25 ML/HR: 9 INJECTION, SOLUTION INTRAVENOUS at 07:12

## 2022-11-01 RX ORDER — AMLODIPINE BESYLATE 5 MG/1
5 TABLET ORAL DAILY
Qty: 30 TABLET | Refills: 0 | Status: SHIPPED | OUTPATIENT
Start: 2022-11-01 | End: 2022-11-28

## 2022-11-01 RX ORDER — ATORVASTATIN CALCIUM 80 MG/1
80 TABLET, FILM COATED ORAL NIGHTLY
Qty: 30 TABLET | Refills: 0 | Status: SHIPPED | OUTPATIENT
Start: 2022-11-01 | End: 2022-11-28

## 2022-11-28 RX ORDER — AMLODIPINE BESYLATE 5 MG/1
5 TABLET ORAL DAILY
Qty: 30 TABLET | Refills: 0 | Status: SHIPPED | OUTPATIENT
Start: 2022-11-28 | End: 2022-12-27

## 2022-11-28 RX ORDER — ATORVASTATIN CALCIUM 80 MG/1
80 TABLET, FILM COATED ORAL NIGHTLY
Qty: 30 TABLET | Refills: 0 | Status: SHIPPED | OUTPATIENT
Start: 2022-11-28 | End: 2022-12-27

## 2022-12-27 RX ORDER — AMLODIPINE BESYLATE 5 MG/1
5 TABLET ORAL DAILY
Qty: 90 TABLET | Refills: 3 | Status: SHIPPED | OUTPATIENT
Start: 2022-12-27

## 2022-12-27 RX ORDER — ATORVASTATIN CALCIUM 80 MG/1
80 TABLET, FILM COATED ORAL NIGHTLY
Qty: 90 TABLET | Refills: 3 | Status: SHIPPED | OUTPATIENT
Start: 2022-12-27

## 2023-09-05 RX ORDER — LOSARTAN POTASSIUM 50 MG/1
50 TABLET ORAL DAILY
Qty: 90 TABLET | Refills: 3 | Status: SHIPPED | OUTPATIENT
Start: 2023-09-05

## 2023-12-04 ENCOUNTER — READMISSION MANAGEMENT (OUTPATIENT)
Dept: CALL CENTER | Facility: HOSPITAL | Age: 53
End: 2023-12-04
Payer: MEDICAID

## 2023-12-04 ENCOUNTER — TELEPHONE (OUTPATIENT)
Dept: INTERNAL MEDICINE | Facility: CLINIC | Age: 53
End: 2023-12-04

## 2023-12-04 NOTE — TELEPHONE ENCOUNTER
Caller: ADDI    Relationship to patient: [facility]    Best call back number: [phone]    New or established patient?  [x] New  [] Established    Date of discharge: 12.3.23-WAS NOT SURE IF IT WAS 12.3.23 OR 12.2.23    Facility discharged from: [facility]    Diagnosis/Symptoms: URINARY RETENTION, LOWER ABDOMINAL PAIN AND SEPSIS    Length of stay (If applicable): 2-3 DAYS

## 2023-12-04 NOTE — OUTREACH NOTE
Prep Survey      Flowsheet Row Responses   [facility]? [facility]   Is LACE score < 7 ? Non- Discharge   Eligibility [facility]   Date of Admission 11/30/23   Date of Discharge 12/02/23   Discharge Disposition Home or Self Care   Discharge diagnosis Suspected prostatitis, urinary retention, sepsis   Does the patient have one of the following disease processes/diagnoses(primary or secondary)? Sepsis   Prep survey completed? Yes            Milagro OLIVAREZ - Registered Nurse

## 2023-12-05 ENCOUNTER — TRANSITIONAL CARE MANAGEMENT TELEPHONE ENCOUNTER (OUTPATIENT)
Dept: CALL CENTER | Facility: HOSPITAL | Age: 53
End: 2023-12-05
Payer: MEDICAID

## 2023-12-05 NOTE — OUTREACH NOTE
Call Center TCM Note      Flowsheet Row Responses   [facility] patient discharged from? Non-  [[facility]]   Does the patient have one of the following disease processes/diagnoses(primary or secondary)? Sepsis   TCM attempt successful? Yes   Call start time 1125   Call end time 1126   Discharge diagnosis Suspected prostatitis, urinary retention, sepsis   Does the patient have all medications ordered at discharge? Yes   Is the patient taking all medications as directed (includes completed medication regime)? Yes   Comments New patient appt with PCP Dr. Salas on 12/7   Does the patient have an appointment with their PCP within 7-14 days of discharge? Yes   Has home health visited the patient within 72 hours of discharge? N/A   Psychosocial issues? No   Did the patient receive a copy of their discharge instructions? Yes   What is the patient's perception of their health status since discharge? Improving   Is the patient/caregiver able to teach back signs and symptoms related to disease process for when to call PCP? Yes   Is the patient/caregiver able to teach back signs and symptoms related to disease process for when to call 911? Yes   Is the patient/caregiver able to teach back the hierarchy of who to call/visit for symptoms/problems? PCP, Specialist, Home health nurse, Urgent Care, ED, 911 Yes   TCM call completed? Yes   Wrap up additional comments Doing well, no questions, confirmed new patient appt with PCP office for 12/7.   Call end time 1126   Would this patient benefit from a Referral to Amb Social Work? No   Is the patient interested in additional calls from an ambulatory ? No            Sunita Mccormick RN    12/5/2023, 11:26 CST

## (undated) DEVICE — ANGIO-SEAL EVOLUTION VASCULAR CLOSURE DEVICE: Brand: ANGIO-SEAL

## (undated) DEVICE — ARTERIAL NEEDLE: Brand: UNBRANDED

## (undated) DEVICE — ELECTRODE,RT,STRESS,FOAM,50PK: Brand: MEDLINE

## (undated) DEVICE — RADIFOCUS OPTITORQUE ANGIOGRAPHIC CATHETER: Brand: OPTITORQUE

## (undated) DEVICE — A2000 MULTI-USE SYRINGE KIT, P/N 701277-003KIT CONTENTS: 100ML CONTRAST RESERVOIR AND TUBING WITH CONTRAST SPIKE AND CLAMP: Brand: A2000 MULTI-USE SYRINGE KIT

## (undated) DEVICE — X-DRAPE ABS 12"X17" .25MM LEAD EQUIV STERILE X-RAY SHIELD 10/BOX: Brand: X-DRAPE

## (undated) DEVICE — GW PERIPH GUIDERIGHT STD/EXCHNG/J/TIP SS 0.038IN 5X260CM

## (undated) DEVICE — CATH DIAG EXPO M/ PK 6FR FL4/FR4 PIG 3PK

## (undated) DEVICE — GW PERIPH GUIDERIGHT STD/J/TP PTFE/PCOAT SS 0.038IN 5X150CM

## (undated) DEVICE — MODEL BT2000 P/N 700287-012KIT CONTENTS: MANIFOLD WITH SALINE AND CONTRAST PORTS, SALINE TUBING WITH SPIKE AND HAND SYRINGE, TRANSDUCER: Brand: BT2000 AUTOMATED MANIFOLD KIT

## (undated) DEVICE — MODEL AT P65, P/N 701554-001KIT CONTENTS: HAND CONTROLLER, 3-WAY HIGH-PRESSURE STOPCOCK WITH ROTATING END AND PREMIUM HIGH-PRESSURE TUBING: Brand: ANGIOTOUCH® KIT

## (undated) DEVICE — CATH DIAG EXPO .045 FL3.5 5F 100CM

## (undated) DEVICE — TR BAND RADIAL ARTERY COMPRESSION DEVICE: Brand: TR BAND

## (undated) DEVICE — INTRO SHEATH ART/FEM ENGAGE .038 6F12CM

## (undated) DEVICE — PK CATH LAB 60

## (undated) DEVICE — RUNWAY RADL W/TOP PAD

## (undated) DEVICE — GLIDESHEATH SLENDER STAINLESS STEEL KIT: Brand: GLIDESHEATH SLENDER